# Patient Record
Sex: FEMALE | Race: ASIAN | NOT HISPANIC OR LATINO | URBAN - METROPOLITAN AREA
[De-identification: names, ages, dates, MRNs, and addresses within clinical notes are randomized per-mention and may not be internally consistent; named-entity substitution may affect disease eponyms.]

---

## 2021-06-22 ENCOUNTER — EMERGENCY (EMERGENCY)
Facility: HOSPITAL | Age: 66
LOS: 1 days | Discharge: HOME | End: 2021-06-22
Attending: EMERGENCY MEDICINE | Admitting: EMERGENCY MEDICINE
Payer: MEDICAID

## 2021-06-22 VITALS
DIASTOLIC BLOOD PRESSURE: 95 MMHG | OXYGEN SATURATION: 97 % | HEIGHT: 61 IN | WEIGHT: 113.1 LBS | HEART RATE: 77 BPM | RESPIRATION RATE: 18 BRPM | TEMPERATURE: 98 F | SYSTOLIC BLOOD PRESSURE: 153 MMHG

## 2021-06-22 VITALS
SYSTOLIC BLOOD PRESSURE: 127 MMHG | DIASTOLIC BLOOD PRESSURE: 78 MMHG | TEMPERATURE: 98 F | OXYGEN SATURATION: 96 % | RESPIRATION RATE: 15 BRPM | HEART RATE: 72 BPM

## 2021-06-22 LAB
ANION GAP SERPL CALC-SCNC: 9 MMOL/L — SIGNIFICANT CHANGE UP (ref 5–17)
APPEARANCE UR: CLEAR — SIGNIFICANT CHANGE UP
BASOPHILS # BLD AUTO: 0.04 K/UL — SIGNIFICANT CHANGE UP (ref 0–0.2)
BASOPHILS NFR BLD AUTO: 0.5 % — SIGNIFICANT CHANGE UP (ref 0–2)
BILIRUB UR-MCNC: NEGATIVE — SIGNIFICANT CHANGE UP
BUN SERPL-MCNC: 16 MG/DL — SIGNIFICANT CHANGE UP (ref 7–23)
CALCIUM SERPL-MCNC: 9.3 MG/DL — SIGNIFICANT CHANGE UP (ref 8.4–10.5)
CHLORIDE SERPL-SCNC: 104 MMOL/L — SIGNIFICANT CHANGE UP (ref 96–108)
CO2 SERPL-SCNC: 28 MMOL/L — SIGNIFICANT CHANGE UP (ref 22–31)
COLOR SPEC: YELLOW — SIGNIFICANT CHANGE UP
CREAT SERPL-MCNC: 0.61 MG/DL — SIGNIFICANT CHANGE UP (ref 0.5–1.3)
DIFF PNL FLD: ABNORMAL
EOSINOPHIL # BLD AUTO: 0.11 K/UL — SIGNIFICANT CHANGE UP (ref 0–0.5)
EOSINOPHIL NFR BLD AUTO: 1.3 % — SIGNIFICANT CHANGE UP (ref 0–6)
GLUCOSE SERPL-MCNC: 116 MG/DL — HIGH (ref 70–99)
GLUCOSE UR QL: NEGATIVE MG/DL — SIGNIFICANT CHANGE UP
HCT VFR BLD CALC: 39.6 % — SIGNIFICANT CHANGE UP (ref 34.5–45)
HGB BLD-MCNC: 13.7 G/DL — SIGNIFICANT CHANGE UP (ref 11.5–15.5)
IMM GRANULOCYTES NFR BLD AUTO: 0.2 % — SIGNIFICANT CHANGE UP (ref 0–1.5)
KETONES UR-MCNC: NEGATIVE — SIGNIFICANT CHANGE UP
LEUKOCYTE ESTERASE UR-ACNC: ABNORMAL
LYMPHOCYTES # BLD AUTO: 1.88 K/UL — SIGNIFICANT CHANGE UP (ref 1–3.3)
LYMPHOCYTES # BLD AUTO: 22.2 % — SIGNIFICANT CHANGE UP (ref 13–44)
MAGNESIUM SERPL-MCNC: 2.2 MG/DL — SIGNIFICANT CHANGE UP (ref 1.6–2.6)
MCHC RBC-ENTMCNC: 31.9 PG — SIGNIFICANT CHANGE UP (ref 27–34)
MCHC RBC-ENTMCNC: 34.6 GM/DL — SIGNIFICANT CHANGE UP (ref 32–36)
MCV RBC AUTO: 92.3 FL — SIGNIFICANT CHANGE UP (ref 80–100)
MONOCYTES # BLD AUTO: 0.7 K/UL — SIGNIFICANT CHANGE UP (ref 0–0.9)
MONOCYTES NFR BLD AUTO: 8.3 % — SIGNIFICANT CHANGE UP (ref 2–14)
NEUTROPHILS # BLD AUTO: 5.73 K/UL — SIGNIFICANT CHANGE UP (ref 1.8–7.4)
NEUTROPHILS NFR BLD AUTO: 67.5 % — SIGNIFICANT CHANGE UP (ref 43–77)
NITRITE UR-MCNC: NEGATIVE — SIGNIFICANT CHANGE UP
NRBC # BLD: 0 /100 WBCS — SIGNIFICANT CHANGE UP (ref 0–0)
PH UR: 7 — SIGNIFICANT CHANGE UP (ref 5–8)
PLATELET # BLD AUTO: 222 K/UL — SIGNIFICANT CHANGE UP (ref 150–400)
POTASSIUM SERPL-MCNC: 3.4 MMOL/L — LOW (ref 3.5–5.3)
POTASSIUM SERPL-SCNC: 3.4 MMOL/L — LOW (ref 3.5–5.3)
PROT UR-MCNC: NEGATIVE MG/DL — SIGNIFICANT CHANGE UP
RBC # BLD: 4.29 M/UL — SIGNIFICANT CHANGE UP (ref 3.8–5.2)
RBC # FLD: 12.2 % — SIGNIFICANT CHANGE UP (ref 10.3–14.5)
SODIUM SERPL-SCNC: 141 MMOL/L — SIGNIFICANT CHANGE UP (ref 135–145)
SP GR SPEC: 1 — LOW (ref 1.01–1.02)
TROPONIN I SERPL-MCNC: 0 NG/ML — LOW (ref 0.02–0.06)
UROBILINOGEN FLD QL: NEGATIVE MG/DL — SIGNIFICANT CHANGE UP
WBC # BLD: 8.48 K/UL — SIGNIFICANT CHANGE UP (ref 3.8–10.5)
WBC # FLD AUTO: 8.48 K/UL — SIGNIFICANT CHANGE UP (ref 3.8–10.5)

## 2021-06-22 PROCEDURE — 70450 CT HEAD/BRAIN W/O DYE: CPT

## 2021-06-22 PROCEDURE — 81001 URINALYSIS AUTO W/SCOPE: CPT

## 2021-06-22 PROCEDURE — 93010 ELECTROCARDIOGRAM REPORT: CPT

## 2021-06-22 PROCEDURE — 93005 ELECTROCARDIOGRAM TRACING: CPT

## 2021-06-22 PROCEDURE — 85025 COMPLETE CBC W/AUTO DIFF WBC: CPT

## 2021-06-22 PROCEDURE — 96374 THER/PROPH/DIAG INJ IV PUSH: CPT

## 2021-06-22 PROCEDURE — 83735 ASSAY OF MAGNESIUM: CPT

## 2021-06-22 PROCEDURE — 99284 EMERGENCY DEPT VISIT MOD MDM: CPT | Mod: 25

## 2021-06-22 PROCEDURE — 70450 CT HEAD/BRAIN W/O DYE: CPT | Mod: 26,MA

## 2021-06-22 PROCEDURE — 36415 COLL VENOUS BLD VENIPUNCTURE: CPT

## 2021-06-22 PROCEDURE — 84484 ASSAY OF TROPONIN QUANT: CPT

## 2021-06-22 PROCEDURE — 80048 BASIC METABOLIC PNL TOTAL CA: CPT

## 2021-06-22 PROCEDURE — 99285 EMERGENCY DEPT VISIT HI MDM: CPT

## 2021-06-22 RX ORDER — MECLIZINE HCL 12.5 MG
25 TABLET ORAL ONCE
Refills: 0 | Status: COMPLETED | OUTPATIENT
Start: 2021-06-22 | End: 2021-06-22

## 2021-06-22 RX ORDER — ONDANSETRON 8 MG/1
4 TABLET, FILM COATED ORAL ONCE
Refills: 0 | Status: COMPLETED | OUTPATIENT
Start: 2021-06-22 | End: 2021-06-22

## 2021-06-22 RX ORDER — MECLIZINE HCL 12.5 MG
1 TABLET ORAL
Qty: 15 | Refills: 0
Start: 2021-06-22

## 2021-06-22 RX ORDER — SODIUM CHLORIDE 9 MG/ML
1000 INJECTION INTRAMUSCULAR; INTRAVENOUS; SUBCUTANEOUS ONCE
Refills: 0 | Status: COMPLETED | OUTPATIENT
Start: 2021-06-22 | End: 2021-06-22

## 2021-06-22 RX ADMIN — SODIUM CHLORIDE 1000 MILLILITER(S): 9 INJECTION INTRAMUSCULAR; INTRAVENOUS; SUBCUTANEOUS at 00:45

## 2021-06-22 RX ADMIN — Medication 25 MILLIGRAM(S): at 00:44

## 2021-06-22 RX ADMIN — ONDANSETRON 4 MILLIGRAM(S): 8 TABLET, FILM COATED ORAL at 00:44

## 2021-06-22 NOTE — ED ADULT NURSE REASSESSMENT NOTE - NS ED NURSE REASSESS COMMENT FT1
pt denies dizziness  nausea and vomiting now, pt awaiting CT scan, son at bedside, pt denies dizziness  nausea and vomiting now, c/o headache, pt awaiting CT scan, son at bedside,

## 2021-06-22 NOTE — ED PROVIDER NOTE - CARE PROVIDER_API CALL
Isidoro Milan  NEUROLOGY  [address], [address]  [place] [address]  Phone: [phone]  Fax: [phone]  Follow Up Time: 4-6 Days

## 2021-06-22 NOTE — ED ADULT NURSE NOTE - OBJECTIVE STATEMENT
pt BIBA son c/o HTN, states pt had dizziness, headache and nausea around 11 AM, pt rested and it went away, but the blood pressure remained high, took BP meds half hour PTA to ED, pt felt dizzy and nauseous en route to hospital, pt reports dizziness worse with movement, speech clear , steady gait, CORDOVA, denies visual disturbances,

## 2021-06-22 NOTE — ED PROVIDER NOTE - PROGRESS NOTE DETAILS
continues to be asymptomatic, bp improved since presentation, will send rx for meclizine, will f/u with pcp asymptomatic, waiting for ct head/labs

## 2021-06-22 NOTE — ED PROVIDER NOTE - CPE EDP PSYCH NORM
History of essential hypertension on lisinopril 5 mg daily with blood pressure 128/90 repeated at 128/84 left and right sitting the patient is having a migraine headache at this time I think which speaks for his elevated diastolic.   normal...

## 2021-06-22 NOTE — ED ADULT NURSE REASSESSMENT NOTE - NS ED NURSE REASSESS COMMENT FT1
pt reported feeling better. d/c to son who verbalized understanding of d/c instructions. vs as charted. left unit in NAD. ambulated unassisted

## 2021-06-22 NOTE — ED PROVIDER NOTE - PATIENT PORTAL LINK FT
You can access the FollowMyHealth Patient Portal offered by [facility] by registering at the following website: [email or web address]. By joining [employer]’s FollowMyHealth portal, you will also be able to view your health information using other applications (apps) compatible with our system.

## 2021-06-22 NOTE — ED PROVIDER NOTE - CLINICAL SUMMARY MEDICAL DECISION MAKING FREE TEXT BOX
66 y.o. F BIB family for 1d vertigo with vomiting and elevated BP - has had similar symptoms in the past, never worked up - will check basic labs, ct head, pt took extra dose of BP meds at home, already bp is improved, will give zofran and meclizine for symptoms and reassess

## 2021-06-22 NOTE — ED PROVIDER NOTE - OBJECTIVE STATEMENT
66 y.o. F c/o HTN and dizziness, BIB son, was feeling well this morning, took am meds, had breakfast, around 11 felt tired, laid down, at about 1130 grandson asked her to do something, she got up and felt HA, spinning sensation, nausea and vomited, checked BP and was elevated, tried to rest through the day, the dizziness went away, BP stayed elevated, about 1hr PTA pt took an extra dose of her BP meds, after 30 min BP was not improved, son brought pt here, en route the vertigo came back, feeling slight HA and dizziness/nausea now 66 y.o. F c/o HTN and dizziness, BIB son, was feeling well this morning, took am meds, had breakfast, around 11 felt tired, laid down, at about 1130 grandson asked her to do something, she got up and felt HA, spinning sensation, nausea and vomited, checked BP and was elevated, tried to rest through the day, the dizziness went away, BP stayed elevated, about 1hr PTA pt took an extra dose of her BP meds, after 30 min BP was not improved, son brought pt here, en route the vertigo came back, feeling slight HA and dizziness/nausea now. gets the same feeling about once a year, has been happening for years, never evaluated for it, and only came to ED bc blood pressure was reading high

## 2021-07-28 ENCOUNTER — APPOINTMENT (OUTPATIENT)
Dept: CARDIOLOGY | Facility: CLINIC | Age: 66
End: 2021-07-28
Payer: MEDICAID

## 2021-07-28 VITALS
SYSTOLIC BLOOD PRESSURE: 138 MMHG | DIASTOLIC BLOOD PRESSURE: 84 MMHG | BODY MASS INDEX: 23.03 KG/M2 | RESPIRATION RATE: 18 BRPM | TEMPERATURE: 98.1 F | OXYGEN SATURATION: 97 % | WEIGHT: 114 LBS | HEART RATE: 86 BPM

## 2021-07-28 DIAGNOSIS — R55 SYNCOPE AND COLLAPSE: ICD-10-CM

## 2021-07-28 PROCEDURE — 99214 OFFICE O/P EST MOD 30 MIN: CPT

## 2021-07-28 NOTE — PHYSICAL EXAM
[General Appearance - Well Developed] : well developed [Normal Appearance] : normal appearance [Well Groomed] : well groomed [General Appearance - Well Nourished] : well nourished [No Deformities] : no deformities [General Appearance - In No Acute Distress] : no acute distress [Normal Conjunctiva] : the conjunctiva exhibited no abnormalities [Eyelids - No Xanthelasma] : the eyelids demonstrated no xanthelasmas [Normal Oral Mucosa] : normal oral mucosa [No Oral Pallor] : no oral pallor [No Oral Cyanosis] : no oral cyanosis [Normal Jugular Venous A Waves Present] : normal jugular venous A waves present [Normal Jugular Venous V Waves Present] : normal jugular venous V waves present [No Jugular Venous Rosa A Waves] : no jugular venous rosa A waves [Heart Rate And Rhythm] : heart rate and rhythm were normal [Heart Sounds] : normal S1 and S2 [Murmurs] : no murmurs present [Arterial Pulses Normal] : the arterial pulses were normal [Edema] : no peripheral edema present [Respiration, Rhythm And Depth] : normal respiratory rhythm and effort [Exaggerated Use Of Accessory Muscles For Inspiration] : no accessory muscle use [Auscultation Breath Sounds / Voice Sounds] : lungs were clear to auscultation bilaterally [Abdomen Soft] : soft [Abdomen Tenderness] : non-tender [Abdomen Mass (___ Cm)] : no abdominal mass palpated [Abnormal Walk] : normal gait [Gait - Sufficient For Exercise Testing] : the gait was sufficient for exercise testing [Nail Clubbing] : no clubbing of the fingernails [Cyanosis, Localized] : no localized cyanosis [Petechial Hemorrhages (___cm)] : no petechial hemorrhages [] : no ischemic changes [Oriented To Time, Place, And Person] : oriented to person, place, and time [Affect] : the affect was normal [Mood] : the mood was normal [No Anxiety] : not feeling anxious

## 2021-07-30 ENCOUNTER — APPOINTMENT (OUTPATIENT)
Dept: CARDIOLOGY | Facility: CLINIC | Age: 66
End: 2021-07-30
Payer: MEDICAID

## 2021-07-30 VITALS
RESPIRATION RATE: 18 BRPM | HEART RATE: 73 BPM | SYSTOLIC BLOOD PRESSURE: 130 MMHG | TEMPERATURE: 98.2 F | DIASTOLIC BLOOD PRESSURE: 80 MMHG | OXYGEN SATURATION: 97 %

## 2021-07-30 PROCEDURE — 93306 TTE W/DOPPLER COMPLETE: CPT

## 2021-07-31 PROBLEM — R55 SYNCOPE: Status: ACTIVE | Noted: 2021-07-30

## 2021-08-03 NOTE — DISCUSSION/SUMMARY
[FreeTextEntry1] : 60-year-old female with HTN, hyperlipidemia, h/o tachycardia s/p ablation, hiatal hernia, hyperglycemia, osteoporosis, presents for evaluation of chest discomfort.  Patient reports that for the past week she has been experiencing upper and substernal chest discomfort, described as tightness, not related to exertion or to meals, relieved with hiccups.  She denies SOB with exertion.  She denies palpitations ever since ablation.  She denies h/o syncope.\par \par (1) Chest discomfort, non-exertional -  Patient underwent a stress echo and completed 7.5 minutes of Marcelino protocol.  There was no ECG or echocardiographic evidence of ischemia.  Patient was reassured.  Her symptom may be GI in etiology.  I advised her to continue Omeprazole. I advised her to hold Alendronate until she feels better.\par \par (2) Followup - as needed.

## 2021-08-03 NOTE — HISTORY OF PRESENT ILLNESS
[FreeTextEntry1] : 66-year-old female with HTN, hyperlipidemia, h/o tachycardia s/p ablation, hiatal hernia, hyperglycemia, osteoporosis, presents for followup.  \par \par Patient was last seen on 5/28/15 for evaluation of chest discomfort.  Patient underwent a stress echo and completed 7.5 minutes of Marcelino protocol. There was no ECG or echocardiographic evidence of ischemia. Her symptom was felt to be GI in etiology.  I advised her to continue Omeprazole. I advised her to hold Alendronate until she feels better.\par \par

## 2021-08-03 NOTE — REASON FOR VISIT
[Symptom and Test Evaluation] : symptom and test evaluation [FreeTextEntry1] : 7/28/21 - Patient had recent episode of syncope when she had stomach ache using the bathroom. Patient hit her head and was unconscious with diaphoresis. Patient reports palpitations. Patient denies CP. Patient denies SOB.Patient had an episode of vertigo with vomiting 1 month ago. Her BP was elevated at the time and she went to ER. She had head CT which was negative. She has been having headaches since.  I advised patient to undergo an echocardiogram. I advised patient to wear a Holter monitor. \par \par \par 5/28/15 - Patient reports that for the past week she has been experiencing upper and substernal chest discomfort, described as tightness, not related to exertion or to meals, relieved with hiccups.  She denies SOB with exertion.  She denies palpitations ever since ablation.  She denies h/o syncope.

## 2021-08-04 ENCOUNTER — NON-APPOINTMENT (OUTPATIENT)
Age: 66
End: 2021-08-04

## 2021-08-05 ENCOUNTER — NON-APPOINTMENT (OUTPATIENT)
Age: 66
End: 2021-08-05

## 2021-08-10 ENCOUNTER — APPOINTMENT (OUTPATIENT)
Dept: CARDIOLOGY | Facility: CLINIC | Age: 66
End: 2021-08-10
Payer: MEDICAID

## 2021-08-10 ENCOUNTER — NON-APPOINTMENT (OUTPATIENT)
Age: 66
End: 2021-08-10

## 2021-08-10 VITALS
OXYGEN SATURATION: 98 % | SYSTOLIC BLOOD PRESSURE: 144 MMHG | WEIGHT: 112 LBS | TEMPERATURE: 97.6 F | HEART RATE: 76 BPM | DIASTOLIC BLOOD PRESSURE: 84 MMHG | RESPIRATION RATE: 18 BRPM | BODY MASS INDEX: 22.62 KG/M2

## 2021-08-10 PROCEDURE — 93000 ELECTROCARDIOGRAM COMPLETE: CPT | Mod: 59

## 2021-08-10 PROCEDURE — 93015 CV STRESS TEST SUPVJ I&R: CPT

## 2021-08-10 PROCEDURE — 99214 OFFICE O/P EST MOD 30 MIN: CPT | Mod: 25

## 2021-10-22 ENCOUNTER — EMERGENCY (EMERGENCY)
Facility: HOSPITAL | Age: 66
LOS: 1 days | Discharge: ANOTHER HOSPITAL | End: 2021-10-22
Attending: EMERGENCY MEDICINE | Admitting: EMERGENCY MEDICINE
Payer: MEDICAID

## 2021-10-22 ENCOUNTER — INPATIENT (INPATIENT)
Facility: HOSPITAL | Age: 66
LOS: 1 days | Discharge: HOME | DRG: 442 | End: 2021-10-24
Attending: STUDENT IN AN ORGANIZED HEALTH CARE EDUCATION/TRAINING PROGRAM | Admitting: STUDENT IN AN ORGANIZED HEALTH CARE EDUCATION/TRAINING PROGRAM
Payer: MEDICAID

## 2021-10-22 VITALS
TEMPERATURE: 98 F | RESPIRATION RATE: 18 BRPM | HEART RATE: 67 BPM | OXYGEN SATURATION: 97 % | DIASTOLIC BLOOD PRESSURE: 95 MMHG | SYSTOLIC BLOOD PRESSURE: 171 MMHG

## 2021-10-22 VITALS
OXYGEN SATURATION: 96 % | DIASTOLIC BLOOD PRESSURE: 83 MMHG | SYSTOLIC BLOOD PRESSURE: 128 MMHG | WEIGHT: 113.54 LBS | HEART RATE: 75 BPM | RESPIRATION RATE: 18 BRPM

## 2021-10-22 VITALS
TEMPERATURE: 99 F | OXYGEN SATURATION: 96 % | HEART RATE: 76 BPM | WEIGHT: 113.1 LBS | HEIGHT: 61 IN | SYSTOLIC BLOOD PRESSURE: 128 MMHG | DIASTOLIC BLOOD PRESSURE: 84 MMHG | RESPIRATION RATE: 18 BRPM

## 2021-10-22 DIAGNOSIS — S36.113A LACERATION OF LIVER, UNSPECIFIED DEGREE, INITIAL ENCOUNTER: ICD-10-CM

## 2021-10-22 LAB
ABO RH CONFIRMATION: SIGNIFICANT CHANGE UP
ALBUMIN SERPL ELPH-MCNC: 3.6 G/DL — SIGNIFICANT CHANGE UP (ref 3.3–5)
ALBUMIN SERPL ELPH-MCNC: 4.7 G/DL — SIGNIFICANT CHANGE UP (ref 3.3–5.2)
ALP SERPL-CCNC: 54 U/L — SIGNIFICANT CHANGE UP (ref 30–120)
ALP SERPL-CCNC: 62 U/L — SIGNIFICANT CHANGE UP (ref 40–120)
ALT FLD-CCNC: 27 U/L — SIGNIFICANT CHANGE UP
ALT FLD-CCNC: 35 U/L DA — SIGNIFICANT CHANGE UP (ref 10–60)
ANION GAP SERPL CALC-SCNC: 14 MMOL/L — SIGNIFICANT CHANGE UP (ref 5–17)
ANION GAP SERPL CALC-SCNC: 8 MMOL/L — SIGNIFICANT CHANGE UP (ref 5–17)
APPEARANCE UR: CLEAR — SIGNIFICANT CHANGE UP
APPEARANCE UR: CLEAR — SIGNIFICANT CHANGE UP
APTT BLD: 30.9 SEC — SIGNIFICANT CHANGE UP (ref 27.5–35.5)
AST SERPL-CCNC: 26 U/L — SIGNIFICANT CHANGE UP
AST SERPL-CCNC: 26 U/L — SIGNIFICANT CHANGE UP (ref 10–40)
BACTERIA # UR AUTO: ABNORMAL
BASOPHILS # BLD AUTO: 0.04 K/UL — SIGNIFICANT CHANGE UP (ref 0–0.2)
BASOPHILS # BLD AUTO: 0.05 K/UL — SIGNIFICANT CHANGE UP (ref 0–0.2)
BASOPHILS NFR BLD AUTO: 0.6 % — SIGNIFICANT CHANGE UP (ref 0–2)
BASOPHILS NFR BLD AUTO: 0.7 % — SIGNIFICANT CHANGE UP (ref 0–2)
BILIRUB SERPL-MCNC: 0.3 MG/DL — SIGNIFICANT CHANGE UP (ref 0.2–1.2)
BILIRUB SERPL-MCNC: 0.4 MG/DL — SIGNIFICANT CHANGE UP (ref 0.4–2)
BILIRUB UR-MCNC: NEGATIVE — SIGNIFICANT CHANGE UP
BILIRUB UR-MCNC: NEGATIVE — SIGNIFICANT CHANGE UP
BLD GP AB SCN SERPL QL: SIGNIFICANT CHANGE UP
BUN SERPL-MCNC: 14.2 MG/DL — SIGNIFICANT CHANGE UP (ref 8–20)
BUN SERPL-MCNC: 19 MG/DL — SIGNIFICANT CHANGE UP (ref 7–23)
CALCIUM SERPL-MCNC: 8.4 MG/DL — LOW (ref 8.6–10.2)
CALCIUM SERPL-MCNC: 8.4 MG/DL — SIGNIFICANT CHANGE UP (ref 8.4–10.5)
CHLORIDE SERPL-SCNC: 100 MMOL/L — SIGNIFICANT CHANGE UP (ref 98–107)
CHLORIDE SERPL-SCNC: 105 MMOL/L — SIGNIFICANT CHANGE UP (ref 96–108)
CO2 SERPL-SCNC: 24 MMOL/L — SIGNIFICANT CHANGE UP (ref 22–29)
CO2 SERPL-SCNC: 25 MMOL/L — SIGNIFICANT CHANGE UP (ref 22–31)
COLOR SPEC: YELLOW — SIGNIFICANT CHANGE UP
COLOR SPEC: YELLOW — SIGNIFICANT CHANGE UP
CREAT SERPL-MCNC: 0.54 MG/DL — SIGNIFICANT CHANGE UP (ref 0.5–1.3)
CREAT SERPL-MCNC: 0.68 MG/DL — SIGNIFICANT CHANGE UP (ref 0.5–1.3)
DIFF PNL FLD: ABNORMAL
DIFF PNL FLD: NEGATIVE — SIGNIFICANT CHANGE UP
EOSINOPHIL # BLD AUTO: 0.09 K/UL — SIGNIFICANT CHANGE UP (ref 0–0.5)
EOSINOPHIL # BLD AUTO: 0.1 K/UL — SIGNIFICANT CHANGE UP (ref 0–0.5)
EOSINOPHIL NFR BLD AUTO: 1.3 % — SIGNIFICANT CHANGE UP (ref 0–6)
EOSINOPHIL NFR BLD AUTO: 1.4 % — SIGNIFICANT CHANGE UP (ref 0–6)
EPI CELLS # UR: SIGNIFICANT CHANGE UP
GLUCOSE SERPL-MCNC: 132 MG/DL — HIGH (ref 70–99)
GLUCOSE SERPL-MCNC: 173 MG/DL — HIGH (ref 70–99)
GLUCOSE UR QL: NEGATIVE MG/DL — SIGNIFICANT CHANGE UP
GLUCOSE UR QL: NEGATIVE MG/DL — SIGNIFICANT CHANGE UP
HCT VFR BLD CALC: 38 % — SIGNIFICANT CHANGE UP (ref 34.5–45)
HCT VFR BLD CALC: 41.7 % — SIGNIFICANT CHANGE UP (ref 34.5–45)
HGB BLD-MCNC: 12.7 G/DL — SIGNIFICANT CHANGE UP (ref 11.5–15.5)
HGB BLD-MCNC: 14 G/DL — SIGNIFICANT CHANGE UP (ref 11.5–15.5)
IMM GRANULOCYTES NFR BLD AUTO: 0.3 % — SIGNIFICANT CHANGE UP (ref 0–1.5)
IMM GRANULOCYTES NFR BLD AUTO: 0.3 % — SIGNIFICANT CHANGE UP (ref 0–1.5)
INR BLD: 0.96 RATIO — SIGNIFICANT CHANGE UP (ref 0.88–1.16)
KETONES UR-MCNC: NEGATIVE — SIGNIFICANT CHANGE UP
KETONES UR-MCNC: NEGATIVE — SIGNIFICANT CHANGE UP
LEUKOCYTE ESTERASE UR-ACNC: NEGATIVE — SIGNIFICANT CHANGE UP
LEUKOCYTE ESTERASE UR-ACNC: NEGATIVE — SIGNIFICANT CHANGE UP
LYMPHOCYTES # BLD AUTO: 1.57 K/UL — SIGNIFICANT CHANGE UP (ref 1–3.3)
LYMPHOCYTES # BLD AUTO: 2.18 K/UL — SIGNIFICANT CHANGE UP (ref 1–3.3)
LYMPHOCYTES # BLD AUTO: 22.7 % — SIGNIFICANT CHANGE UP (ref 13–44)
LYMPHOCYTES # BLD AUTO: 30.8 % — SIGNIFICANT CHANGE UP (ref 13–44)
MCHC RBC-ENTMCNC: 31.6 PG — SIGNIFICANT CHANGE UP (ref 27–34)
MCHC RBC-ENTMCNC: 31.8 PG — SIGNIFICANT CHANGE UP (ref 27–34)
MCHC RBC-ENTMCNC: 33.4 GM/DL — SIGNIFICANT CHANGE UP (ref 32–36)
MCHC RBC-ENTMCNC: 33.6 GM/DL — SIGNIFICANT CHANGE UP (ref 32–36)
MCV RBC AUTO: 94.1 FL — SIGNIFICANT CHANGE UP (ref 80–100)
MCV RBC AUTO: 95 FL — SIGNIFICANT CHANGE UP (ref 80–100)
MONOCYTES # BLD AUTO: 0.37 K/UL — SIGNIFICANT CHANGE UP (ref 0–0.9)
MONOCYTES # BLD AUTO: 0.4 K/UL — SIGNIFICANT CHANGE UP (ref 0–0.9)
MONOCYTES NFR BLD AUTO: 5.2 % — SIGNIFICANT CHANGE UP (ref 2–14)
MONOCYTES NFR BLD AUTO: 5.8 % — SIGNIFICANT CHANGE UP (ref 2–14)
NEUTROPHILS # BLD AUTO: 4.36 K/UL — SIGNIFICANT CHANGE UP (ref 1.8–7.4)
NEUTROPHILS # BLD AUTO: 4.79 K/UL — SIGNIFICANT CHANGE UP (ref 1.8–7.4)
NEUTROPHILS NFR BLD AUTO: 61.6 % — SIGNIFICANT CHANGE UP (ref 43–77)
NEUTROPHILS NFR BLD AUTO: 69.3 % — SIGNIFICANT CHANGE UP (ref 43–77)
NITRITE UR-MCNC: NEGATIVE — SIGNIFICANT CHANGE UP
NITRITE UR-MCNC: NEGATIVE — SIGNIFICANT CHANGE UP
NRBC # BLD: 0 /100 WBCS — SIGNIFICANT CHANGE UP (ref 0–0)
PH UR: 7 — SIGNIFICANT CHANGE UP (ref 5–8)
PH UR: 7 — SIGNIFICANT CHANGE UP (ref 5–8)
PLATELET # BLD AUTO: 199 K/UL — SIGNIFICANT CHANGE UP (ref 150–400)
PLATELET # BLD AUTO: 222 K/UL — SIGNIFICANT CHANGE UP (ref 150–400)
POTASSIUM SERPL-MCNC: 3.6 MMOL/L — SIGNIFICANT CHANGE UP (ref 3.5–5.3)
POTASSIUM SERPL-MCNC: 3.7 MMOL/L — SIGNIFICANT CHANGE UP (ref 3.5–5.3)
POTASSIUM SERPL-SCNC: 3.6 MMOL/L — SIGNIFICANT CHANGE UP (ref 3.5–5.3)
POTASSIUM SERPL-SCNC: 3.7 MMOL/L — SIGNIFICANT CHANGE UP (ref 3.5–5.3)
PROT SERPL-MCNC: 7.7 G/DL — SIGNIFICANT CHANGE UP (ref 6–8.3)
PROT SERPL-MCNC: 8.2 G/DL — SIGNIFICANT CHANGE UP (ref 6.6–8.7)
PROT UR-MCNC: NEGATIVE MG/DL — SIGNIFICANT CHANGE UP
PROT UR-MCNC: NEGATIVE MG/DL — SIGNIFICANT CHANGE UP
PROTHROM AB SERPL-ACNC: 11.2 SEC — SIGNIFICANT CHANGE UP (ref 10.6–13.6)
RBC # BLD: 4 M/UL — SIGNIFICANT CHANGE UP (ref 3.8–5.2)
RBC # BLD: 4.43 M/UL — SIGNIFICANT CHANGE UP (ref 3.8–5.2)
RBC # FLD: 12.3 % — SIGNIFICANT CHANGE UP (ref 10.3–14.5)
RBC # FLD: 12.3 % — SIGNIFICANT CHANGE UP (ref 10.3–14.5)
RBC CASTS # UR COMP ASSIST: SIGNIFICANT CHANGE UP /HPF (ref 0–4)
SARS-COV-2 RNA SPEC QL NAA+PROBE: SIGNIFICANT CHANGE UP
SODIUM SERPL-SCNC: 138 MMOL/L — SIGNIFICANT CHANGE UP (ref 135–145)
SODIUM SERPL-SCNC: 138 MMOL/L — SIGNIFICANT CHANGE UP (ref 135–145)
SP GR SPEC: 1 — LOW (ref 1.01–1.02)
SP GR SPEC: 1.01 — SIGNIFICANT CHANGE UP (ref 1.01–1.02)
UROBILINOGEN FLD QL: NEGATIVE MG/DL — SIGNIFICANT CHANGE UP
UROBILINOGEN FLD QL: NEGATIVE MG/DL — SIGNIFICANT CHANGE UP
WBC # BLD: 6.91 K/UL — SIGNIFICANT CHANGE UP (ref 3.8–10.5)
WBC # BLD: 7.08 K/UL — SIGNIFICANT CHANGE UP (ref 3.8–10.5)
WBC # FLD AUTO: 6.91 K/UL — SIGNIFICANT CHANGE UP (ref 3.8–10.5)
WBC # FLD AUTO: 7.08 K/UL — SIGNIFICANT CHANGE UP (ref 3.8–10.5)
WBC UR QL: SIGNIFICANT CHANGE UP

## 2021-10-22 PROCEDURE — 87635 SARS-COV-2 COVID-19 AMP PRB: CPT

## 2021-10-22 PROCEDURE — 74177 CT ABD & PELVIS W/CONTRAST: CPT | Mod: 26,MA

## 2021-10-22 PROCEDURE — 85025 COMPLETE CBC W/AUTO DIFF WBC: CPT

## 2021-10-22 PROCEDURE — 99285 EMERGENCY DEPT VISIT HI MDM: CPT

## 2021-10-22 PROCEDURE — 99291 CRITICAL CARE FIRST HOUR: CPT

## 2021-10-22 PROCEDURE — 99285 EMERGENCY DEPT VISIT HI MDM: CPT | Mod: 25

## 2021-10-22 PROCEDURE — 81003 URINALYSIS AUTO W/O SCOPE: CPT

## 2021-10-22 PROCEDURE — 96360 HYDRATION IV INFUSION INIT: CPT

## 2021-10-22 PROCEDURE — 80053 COMPREHEN METABOLIC PANEL: CPT

## 2021-10-22 PROCEDURE — 71260 CT THORAX DX C+: CPT | Mod: 26,MA

## 2021-10-22 PROCEDURE — 74177 CT ABD & PELVIS W/CONTRAST: CPT | Mod: MA

## 2021-10-22 PROCEDURE — 36415 COLL VENOUS BLD VENIPUNCTURE: CPT

## 2021-10-22 PROCEDURE — 93010 ELECTROCARDIOGRAM REPORT: CPT

## 2021-10-22 PROCEDURE — 71260 CT THORAX DX C+: CPT | Mod: MA

## 2021-10-22 RX ORDER — SODIUM CHLORIDE 9 MG/ML
1000 INJECTION, SOLUTION INTRAVENOUS
Refills: 0 | Status: DISCONTINUED | OUTPATIENT
Start: 2021-10-22 | End: 2021-10-24

## 2021-10-22 RX ORDER — LIDOCAINE 4 G/100G
1 CREAM TOPICAL ONCE
Refills: 0 | Status: COMPLETED | OUTPATIENT
Start: 2021-10-22 | End: 2021-10-22

## 2021-10-22 RX ORDER — OXYCODONE AND ACETAMINOPHEN 5; 325 MG/1; MG/1
1 TABLET ORAL ONCE
Refills: 0 | Status: DISCONTINUED | OUTPATIENT
Start: 2021-10-22 | End: 2021-10-22

## 2021-10-22 RX ORDER — ACETAMINOPHEN 500 MG
650 TABLET ORAL EVERY 6 HOURS
Refills: 0 | Status: DISCONTINUED | OUTPATIENT
Start: 2021-10-22 | End: 2021-10-24

## 2021-10-22 RX ORDER — SODIUM CHLORIDE 9 MG/ML
250 INJECTION INTRAMUSCULAR; INTRAVENOUS; SUBCUTANEOUS ONCE
Refills: 0 | Status: COMPLETED | OUTPATIENT
Start: 2021-10-22 | End: 2021-10-22

## 2021-10-22 RX ORDER — OXYCODONE HYDROCHLORIDE 5 MG/1
5 TABLET ORAL EVERY 4 HOURS
Refills: 0 | Status: DISCONTINUED | OUTPATIENT
Start: 2021-10-22 | End: 2021-10-24

## 2021-10-22 RX ORDER — SODIUM CHLORIDE 9 MG/ML
1000 INJECTION INTRAMUSCULAR; INTRAVENOUS; SUBCUTANEOUS ONCE
Refills: 0 | Status: COMPLETED | OUTPATIENT
Start: 2021-10-22 | End: 2021-10-22

## 2021-10-22 RX ORDER — LIDOCAINE 4 G/100G
1 CREAM TOPICAL EVERY 24 HOURS
Refills: 0 | Status: DISCONTINUED | OUTPATIENT
Start: 2021-10-22 | End: 2021-10-24

## 2021-10-22 RX ORDER — FAMOTIDINE 10 MG/ML
20 INJECTION INTRAVENOUS EVERY 12 HOURS
Refills: 0 | Status: DISCONTINUED | OUTPATIENT
Start: 2021-10-22 | End: 2021-10-24

## 2021-10-22 RX ADMIN — OXYCODONE AND ACETAMINOPHEN 1 TABLET(S): 5; 325 TABLET ORAL at 16:31

## 2021-10-22 RX ADMIN — OXYCODONE AND ACETAMINOPHEN 1 TABLET(S): 5; 325 TABLET ORAL at 15:28

## 2021-10-22 RX ADMIN — LIDOCAINE 1 PATCH: 4 CREAM TOPICAL at 15:28

## 2021-10-22 RX ADMIN — SODIUM CHLORIDE 42 MILLILITER(S): 9 INJECTION, SOLUTION INTRAVENOUS at 21:43

## 2021-10-22 RX ADMIN — SODIUM CHLORIDE 1000 MILLILITER(S): 9 INJECTION INTRAMUSCULAR; INTRAVENOUS; SUBCUTANEOUS at 15:29

## 2021-10-22 RX ADMIN — SODIUM CHLORIDE 250 MILLILITER(S): 9 INJECTION INTRAMUSCULAR; INTRAVENOUS; SUBCUTANEOUS at 20:25

## 2021-10-22 RX ADMIN — SODIUM CHLORIDE 1000 MILLILITER(S): 9 INJECTION INTRAMUSCULAR; INTRAVENOUS; SUBCUTANEOUS at 16:31

## 2021-10-22 RX ADMIN — LIDOCAINE 1 PATCH: 4 CREAM TOPICAL at 18:35

## 2021-10-22 NOTE — ED PROVIDER NOTE - MUSCULOSKELETAL, MLM
Spine appears normal, range of motion is not limited, no muscle or joint tenderness, FROM X 4, +abrasion with mild erythema noted to dorsal aspect of R proximal forearm, no bony tenderness or deformities noted, R shoulder/elbow/wrist/hand NT with FROM, fingers warm & mobile, NVI, pelvis stable and NT, no foot drop B, pulses and sensation intact BLE, NVI

## 2021-10-22 NOTE — ED ADULT TRIAGE NOTE - CHIEF COMPLAINT QUOTE
Patient BIBA from [facility]. Trauma transfer accepted by Dr. Cox. Pt s/p mechanical fall. Right 10th rib fracture and Grade 1 liver lac. Code trauma B called.

## 2021-10-22 NOTE — ED PROVIDER NOTE - NS_BEDUNITTYPES_ED_ALL_ED
Patient called to report CD1 yesterday. RN called patient back and scheduled labs and ultrasound for today, tomorrow or Wednesday.   
TELEMETRY

## 2021-10-22 NOTE — ED PROVIDER NOTE - CLINICAL SUMMARY MEDICAL DECISION MAKING FREE TEXT BOX
The patient presents with a fall with right rib pain and abd pain and will be admitted to trauma service

## 2021-10-22 NOTE — ED PROVIDER NOTE - OBJECTIVE STATEMENT
Mandarin intrepretation by daughter, Meli as per pt request. Offered pacific  for translation by pt declined. 65 y/o F with hx of HTN, HLD presents with c/o R flank and rib pain s/p fall today. States that she was vacuuming the wooden stairs and fell down 2 steps hitting her R flank on the corner of the door down the stairs and has pain since. Pt also c/o R lower rib pain, worse with deep inspiration. Denies LOC, head trauma, use of blood thinners, N/V, abdominal pain, numbness, tingling to extremities, open wounds, neck/hip pain or other symptoms/injuries.

## 2021-10-22 NOTE — ED ADULT NURSE NOTE - OBJECTIVE STATEMENT
66 YOF A&OX3 mandarin speaking only (daughter at bedside for translation) presents to ED s/p fall. as per pt's daughter pt was cleaning down the stairs with a portable vaccuum and fell down last 2 steps hitting right side of body against wall. pt did not hit head or lose consciousness. pt complains of right lower back pain and right upper chest wall pain under right breast tender to palpation rated 9/10. upon assessment right arm abrasion with a hematoma noted to posterior upper area of arm. pt denies sob, chest pain, n/v/d, headaches, dizziness, blurry vision. safety maintained.

## 2021-10-22 NOTE — ED PROVIDER NOTE - ATTENDING CONTRIBUTION TO CARE
The patient seen and examined due to critical conditions and required multiple attempts to stabilize the patient

## 2021-10-22 NOTE — ED ADULT NURSE NOTE - NSIMPLEMENTINTERV_GEN_ALL_ED
Implemented All Fall Risk Interventions:  [place] to call system. Call bell, personal items and telephone within reach. Instruct patient to call for assistance. Room bathroom lighting operational. Non-slip footwear when patient is off stretcher. Physically safe environment: no spills, clutter or unnecessary equipment. Stretcher in lowest position, wheels locked, appropriate side rails in place. Provide visual cue, wrist band, yellow gown, etc. Monitor gait and stability. Monitor for mental status changes and reorient to person, place, and time. Review medications for side effects contributing to fall risk. Reinforce activity limits and safety measures with patient and family.

## 2021-10-22 NOTE — ED PROVIDER NOTE - PROGRESS NOTE DETAILS
Remy YANG for ED attending, Dr. Davis: Spoke Dr. Grove, trauma surgeon at [facility] who accepted transfer. Remy YANG for ED attending, Dr. Davis: #: [phone], Spoke pt and mother and informed them of liver laceration and rib fracture and regarding transfer to [facility]. All results were explained to patient and/or family and a copy of all available results given.

## 2021-10-22 NOTE — ED PROVIDER NOTE - NS_ ATTENDINGSCRIBEDETAILS _ED_A_ED_FT
Moose Davis MD - The scribe's documentation has been prepared under my direction and personally reviewed by me in its entirety. I confirm that the note above accurately reflects all work, treatment, procedures, and medical decision making performed by me.

## 2021-10-22 NOTE — ED PROVIDER NOTE - PROGRESS NOTE DETAILS
Resident Sridevi Dickinson: Assessed the pt with Dr. Jackson, transfer from [place], presented for fall down 2 stairs, c/o R abdominal/flank pain, w/u showed grade 1-2 liver laceration and 10th rib fx. Mandarin-speaking.

## 2021-10-22 NOTE — ED ADULT NURSE REASSESSMENT NOTE - NS ED NURSE REASSESS COMMENT FT1
pt informed via  Omayra ([id number]), pt and daughter notified pt to be transferred to [facility], pt gives consent. safety maintained.

## 2021-10-22 NOTE — ED PROVIDER NOTE - RESPIRATORY, MLM
Breath sounds clear and equal bilaterally. +TTP R lower anterior/lateral ribs, no ecchymosis or obvious stepoffs noted

## 2021-10-22 NOTE — ED PROVIDER NOTE - OBJECTIVE STATEMENT
The patient is a 66 year old female presents with fall in the stairs transferred from [facility] for right sided rib fracture and grade 1 liver laceration.  No HA, No neck pain, No CP, No SOB  Stable VS and normal Hb and trauma B activation

## 2021-10-22 NOTE — ED PROVIDER NOTE - CLINICAL SUMMARY MEDICAL DECISION MAKING FREE TEXT BOX
65 y/o F with hx of HTN, HLD presents with c/o R flank and rib pain s/p fall today. States that she was vacuuming the wooden stairs and fell down 2 steps hitting her R flank on the corner of the door down the stairs and has pain since. Pt also c/o R lower rib pain, worse with deep inspiration. Denies LOC, head trauma, use of blood thinners, N/V, abdominal pain, numbness, tingling to extremities, open wounds, neck/hip pain or other symptoms/injuries. PE: as above A/P: will get labs, UA, ct chest/abdomen/pelvis r/o rib/ptx, r/o kidney injury, pain meds, reassess

## 2021-10-22 NOTE — ED ADULT NURSE NOTE - CHIEF COMPLAINT QUOTE
Patient BIBA from [facility]. Trauma transfer accepted by Dr. Cox. Pt s/p mechanical fall. Right 10th rib fracture and Grade 1 liver lac. Code trauma B called.

## 2021-10-23 LAB
ALBUMIN SERPL ELPH-MCNC: 4.5 G/DL — SIGNIFICANT CHANGE UP (ref 3.3–5.2)
ALBUMIN SERPL ELPH-MCNC: 4.6 G/DL — SIGNIFICANT CHANGE UP (ref 3.3–5.2)
ALP SERPL-CCNC: 57 U/L — SIGNIFICANT CHANGE UP (ref 40–120)
ALP SERPL-CCNC: 58 U/L — SIGNIFICANT CHANGE UP (ref 40–120)
ALT FLD-CCNC: 24 U/L — SIGNIFICANT CHANGE UP
ALT FLD-CCNC: 24 U/L — SIGNIFICANT CHANGE UP
ANION GAP SERPL CALC-SCNC: 14 MMOL/L — SIGNIFICANT CHANGE UP (ref 5–17)
ANION GAP SERPL CALC-SCNC: 15 MMOL/L — SIGNIFICANT CHANGE UP (ref 5–17)
APTT BLD: 29 SEC — SIGNIFICANT CHANGE UP (ref 27.5–35.5)
AST SERPL-CCNC: 23 U/L — SIGNIFICANT CHANGE UP
AST SERPL-CCNC: 23 U/L — SIGNIFICANT CHANGE UP
BASOPHILS # BLD AUTO: 0.04 K/UL — SIGNIFICANT CHANGE UP (ref 0–0.2)
BASOPHILS # BLD AUTO: 0.05 K/UL — SIGNIFICANT CHANGE UP (ref 0–0.2)
BASOPHILS NFR BLD AUTO: 0.6 % — SIGNIFICANT CHANGE UP (ref 0–2)
BASOPHILS NFR BLD AUTO: 0.7 % — SIGNIFICANT CHANGE UP (ref 0–2)
BILIRUB SERPL-MCNC: 0.3 MG/DL — LOW (ref 0.4–2)
BILIRUB SERPL-MCNC: 0.4 MG/DL — SIGNIFICANT CHANGE UP (ref 0.4–2)
BUN SERPL-MCNC: 13.8 MG/DL — SIGNIFICANT CHANGE UP (ref 8–20)
BUN SERPL-MCNC: 14 MG/DL — SIGNIFICANT CHANGE UP (ref 8–20)
CALCIUM SERPL-MCNC: 8.3 MG/DL — LOW (ref 8.6–10.2)
CALCIUM SERPL-MCNC: 8.3 MG/DL — LOW (ref 8.6–10.2)
CHLORIDE SERPL-SCNC: 103 MMOL/L — SIGNIFICANT CHANGE UP (ref 98–107)
CHLORIDE SERPL-SCNC: 103 MMOL/L — SIGNIFICANT CHANGE UP (ref 98–107)
CO2 SERPL-SCNC: 23 MMOL/L — SIGNIFICANT CHANGE UP (ref 22–29)
CO2 SERPL-SCNC: 24 MMOL/L — SIGNIFICANT CHANGE UP (ref 22–29)
COVID-19 SPIKE DOMAIN AB INTERP: POSITIVE
COVID-19 SPIKE DOMAIN ANTIBODY RESULT: >250 U/ML — HIGH
CREAT SERPL-MCNC: 0.42 MG/DL — LOW (ref 0.5–1.3)
CREAT SERPL-MCNC: 0.51 MG/DL — SIGNIFICANT CHANGE UP (ref 0.5–1.3)
EOSINOPHIL # BLD AUTO: 0.13 K/UL — SIGNIFICANT CHANGE UP (ref 0–0.5)
EOSINOPHIL # BLD AUTO: 0.14 K/UL — SIGNIFICANT CHANGE UP (ref 0–0.5)
EOSINOPHIL NFR BLD AUTO: 1.8 % — SIGNIFICANT CHANGE UP (ref 0–6)
EOSINOPHIL NFR BLD AUTO: 1.9 % — SIGNIFICANT CHANGE UP (ref 0–6)
GLUCOSE SERPL-MCNC: 103 MG/DL — HIGH (ref 70–99)
GLUCOSE SERPL-MCNC: 97 MG/DL — SIGNIFICANT CHANGE UP (ref 70–99)
HCT VFR BLD CALC: 38.4 % — SIGNIFICANT CHANGE UP (ref 34.5–45)
HCT VFR BLD CALC: 38.7 % — SIGNIFICANT CHANGE UP (ref 34.5–45)
HCT VFR BLD CALC: 39.5 % — SIGNIFICANT CHANGE UP (ref 34.5–45)
HCT VFR BLD CALC: 39.5 % — SIGNIFICANT CHANGE UP (ref 34.5–45)
HCT VFR BLD CALC: 39.6 % — SIGNIFICANT CHANGE UP (ref 34.5–45)
HGB BLD-MCNC: 12.9 G/DL — SIGNIFICANT CHANGE UP (ref 11.5–15.5)
HGB BLD-MCNC: 13 G/DL — SIGNIFICANT CHANGE UP (ref 11.5–15.5)
HGB BLD-MCNC: 13.3 G/DL — SIGNIFICANT CHANGE UP (ref 11.5–15.5)
HGB BLD-MCNC: 13.3 G/DL — SIGNIFICANT CHANGE UP (ref 11.5–15.5)
HGB BLD-MCNC: 13.4 G/DL — SIGNIFICANT CHANGE UP (ref 11.5–15.5)
IMM GRANULOCYTES NFR BLD AUTO: 0.1 % — SIGNIFICANT CHANGE UP (ref 0–1.5)
IMM GRANULOCYTES NFR BLD AUTO: 0.4 % — SIGNIFICANT CHANGE UP (ref 0–1.5)
INR BLD: 1.01 RATIO — SIGNIFICANT CHANGE UP (ref 0.88–1.16)
LYMPHOCYTES # BLD AUTO: 2.16 K/UL — SIGNIFICANT CHANGE UP (ref 1–3.3)
LYMPHOCYTES # BLD AUTO: 2.27 K/UL — SIGNIFICANT CHANGE UP (ref 1–3.3)
LYMPHOCYTES # BLD AUTO: 29.8 % — SIGNIFICANT CHANGE UP (ref 13–44)
LYMPHOCYTES # BLD AUTO: 30.4 % — SIGNIFICANT CHANGE UP (ref 13–44)
MAGNESIUM SERPL-MCNC: 2.2 MG/DL — SIGNIFICANT CHANGE UP (ref 1.6–2.6)
MCHC RBC-ENTMCNC: 31.5 PG — SIGNIFICANT CHANGE UP (ref 27–34)
MCHC RBC-ENTMCNC: 31.5 PG — SIGNIFICANT CHANGE UP (ref 27–34)
MCHC RBC-ENTMCNC: 32 PG — SIGNIFICANT CHANGE UP (ref 27–34)
MCHC RBC-ENTMCNC: 32.2 PG — SIGNIFICANT CHANGE UP (ref 27–34)
MCHC RBC-ENTMCNC: 33.3 GM/DL — SIGNIFICANT CHANGE UP (ref 32–36)
MCHC RBC-ENTMCNC: 33.7 GM/DL — SIGNIFICANT CHANGE UP (ref 32–36)
MCHC RBC-ENTMCNC: 33.8 GM/DL — SIGNIFICANT CHANGE UP (ref 32–36)
MCHC RBC-ENTMCNC: 33.9 GM/DL — SIGNIFICANT CHANGE UP (ref 32–36)
MCV RBC AUTO: 93 FL — SIGNIFICANT CHANGE UP (ref 80–100)
MCV RBC AUTO: 94.4 FL — SIGNIFICANT CHANGE UP (ref 80–100)
MCV RBC AUTO: 94.5 FL — SIGNIFICANT CHANGE UP (ref 80–100)
MCV RBC AUTO: 95.6 FL — SIGNIFICANT CHANGE UP (ref 80–100)
MONOCYTES # BLD AUTO: 0.53 K/UL — SIGNIFICANT CHANGE UP (ref 0–0.9)
MONOCYTES # BLD AUTO: 0.54 K/UL — SIGNIFICANT CHANGE UP (ref 0–0.9)
MONOCYTES NFR BLD AUTO: 7.1 % — SIGNIFICANT CHANGE UP (ref 2–14)
MONOCYTES NFR BLD AUTO: 7.4 % — SIGNIFICANT CHANGE UP (ref 2–14)
NEUTROPHILS # BLD AUTO: 4.35 K/UL — SIGNIFICANT CHANGE UP (ref 1.8–7.4)
NEUTROPHILS # BLD AUTO: 4.46 K/UL — SIGNIFICANT CHANGE UP (ref 1.8–7.4)
NEUTROPHILS NFR BLD AUTO: 59.8 % — SIGNIFICANT CHANGE UP (ref 43–77)
NEUTROPHILS NFR BLD AUTO: 60 % — SIGNIFICANT CHANGE UP (ref 43–77)
PHOSPHATE SERPL-MCNC: 3.6 MG/DL — SIGNIFICANT CHANGE UP (ref 2.4–4.7)
PLATELET # BLD AUTO: 208 K/UL — SIGNIFICANT CHANGE UP (ref 150–400)
PLATELET # BLD AUTO: 209 K/UL — SIGNIFICANT CHANGE UP (ref 150–400)
PLATELET # BLD AUTO: 216 K/UL — SIGNIFICANT CHANGE UP (ref 150–400)
PLATELET # BLD AUTO: 219 K/UL — SIGNIFICANT CHANGE UP (ref 150–400)
POTASSIUM SERPL-MCNC: 3.5 MMOL/L — SIGNIFICANT CHANGE UP (ref 3.5–5.3)
POTASSIUM SERPL-MCNC: 3.5 MMOL/L — SIGNIFICANT CHANGE UP (ref 3.5–5.3)
POTASSIUM SERPL-SCNC: 3.5 MMOL/L — SIGNIFICANT CHANGE UP (ref 3.5–5.3)
POTASSIUM SERPL-SCNC: 3.5 MMOL/L — SIGNIFICANT CHANGE UP (ref 3.5–5.3)
PROT SERPL-MCNC: 7.3 G/DL — SIGNIFICANT CHANGE UP (ref 6.6–8.7)
PROT SERPL-MCNC: 7.5 G/DL — SIGNIFICANT CHANGE UP (ref 6.6–8.7)
PROTHROM AB SERPL-ACNC: 11.7 SEC — SIGNIFICANT CHANGE UP (ref 10.6–13.6)
RBC # BLD: 4.1 M/UL — SIGNIFICANT CHANGE UP (ref 3.8–5.2)
RBC # BLD: 4.13 M/UL — SIGNIFICANT CHANGE UP (ref 3.8–5.2)
RBC # BLD: 4.13 M/UL — SIGNIFICANT CHANGE UP (ref 3.8–5.2)
RBC # BLD: 4.19 M/UL — SIGNIFICANT CHANGE UP (ref 3.8–5.2)
RBC # FLD: 12.4 % — SIGNIFICANT CHANGE UP (ref 10.3–14.5)
RBC # FLD: 12.5 % — SIGNIFICANT CHANGE UP (ref 10.3–14.5)
SARS-COV-2 IGG+IGM SERPL QL IA: >250 U/ML — HIGH
SARS-COV-2 IGG+IGM SERPL QL IA: POSITIVE
SARS-COV-2 RNA SPEC QL NAA+PROBE: SIGNIFICANT CHANGE UP
SODIUM SERPL-SCNC: 141 MMOL/L — SIGNIFICANT CHANGE UP (ref 135–145)
SODIUM SERPL-SCNC: 141 MMOL/L — SIGNIFICANT CHANGE UP (ref 135–145)
TROPONIN T SERPL-MCNC: <0.01 NG/ML — SIGNIFICANT CHANGE UP (ref 0–0.06)
WBC # BLD: 6.77 K/UL — SIGNIFICANT CHANGE UP (ref 3.8–10.5)
WBC # BLD: 6.97 K/UL — SIGNIFICANT CHANGE UP (ref 3.8–10.5)
WBC # BLD: 7.25 K/UL — SIGNIFICANT CHANGE UP (ref 3.8–10.5)
WBC # BLD: 7.46 K/UL — SIGNIFICANT CHANGE UP (ref 3.8–10.5)
WBC # FLD AUTO: 6.77 K/UL — SIGNIFICANT CHANGE UP (ref 3.8–10.5)
WBC # FLD AUTO: 6.97 K/UL — SIGNIFICANT CHANGE UP (ref 3.8–10.5)
WBC # FLD AUTO: 7.25 K/UL — SIGNIFICANT CHANGE UP (ref 3.8–10.5)
WBC # FLD AUTO: 7.46 K/UL — SIGNIFICANT CHANGE UP (ref 3.8–10.5)

## 2021-10-23 PROCEDURE — 99231 SBSQ HOSP IP/OBS SF/LOW 25: CPT | Mod: GC

## 2021-10-23 PROCEDURE — 93010 ELECTROCARDIOGRAM REPORT: CPT

## 2021-10-23 RX ORDER — POTASSIUM CHLORIDE 20 MEQ
40 PACKET (EA) ORAL EVERY 4 HOURS
Refills: 0 | Status: COMPLETED | OUTPATIENT
Start: 2021-10-23 | End: 2021-10-23

## 2021-10-23 RX ADMIN — FAMOTIDINE 20 MILLIGRAM(S): 10 INJECTION INTRAVENOUS at 05:10

## 2021-10-23 RX ADMIN — FAMOTIDINE 20 MILLIGRAM(S): 10 INJECTION INTRAVENOUS at 18:18

## 2021-10-23 RX ADMIN — SODIUM CHLORIDE 42 MILLILITER(S): 9 INJECTION, SOLUTION INTRAVENOUS at 22:00

## 2021-10-23 RX ADMIN — Medication 40 MILLIEQUIVALENT(S): at 17:57

## 2021-10-23 RX ADMIN — LIDOCAINE 1 PATCH: 4 CREAM TOPICAL at 21:58

## 2021-10-23 RX ADMIN — Medication 40 MILLIEQUIVALENT(S): at 11:59

## 2021-10-23 RX ADMIN — SODIUM CHLORIDE 42 MILLILITER(S): 9 INJECTION, SOLUTION INTRAVENOUS at 05:49

## 2021-10-23 NOTE — H&P ADULT - ATTENDING COMMENTS
Agree with exam findings.   Will observe with serial abdominal exams and H/H trend; will likely be able to transition towards discharge within 24-48hrs if remains stable.

## 2021-10-23 NOTE — H&P ADULT - NSHPPHYSICALEXAM_GEN_ALL_CORE
Primary   Airway intact  Breathing equal bilaterally  Circulation peripherally and centrally intact   GCS 15  No external signs of trauma    Secondary  Neurologically intact  CVS: Heart sounds heard  RS: EWOB  Abd: Soft, tender in RUQ and R flank

## 2021-10-23 NOTE — H&P ADULT - NSHPLABSRESULTS_GEN_ALL_CORE
.  LABS:                         13.3   x     )-----------( x        ( 23 Oct 2021 00:50 )             39.5     10-    138  |  100  |  14.2  ----------------------------<  173<H>  3.7   |  24.0  |  0.54    Ca    8.4<L>      22 Oct 2021 19:33    TPro  8.2  /  Alb  4.7  /  TBili  0.4  /  DBili  x   /  AST  26  /  ALT  27  /  AlkPhos  62  10-    PT/INR - ( 22 Oct 2021 19:33 )   PT: 11.2 sec;   INR: 0.96 ratio         PTT - ( 22 Oct 2021 19:33 )  PTT:30.9 sec  Urinalysis Basic - ( 22 Oct 2021 23:45 )    Color: Yellow / Appearance: Clear / S.005 / pH: x  Gluc: x / Ketone: Negative  / Bili: Negative / Urobili: Negative mg/dL   Blood: x / Protein: Negative mg/dL / Nitrite: Negative   Leuk Esterase: Negative / RBC: 0-2 /HPF / WBC 0-2   Sq Epi: x / Non Sq Epi: Occasional / Bacteria: Occasional            RADIOLOGY, EKG & ADDITIONAL TESTS: Reviewed.     R 10th Rib Fracture  Grade 1-2 liver lac   Side branch IPMN  Calcified nodules in TEA 0.7cm

## 2021-10-23 NOTE — CHART NOTE - NSCHARTNOTEFT_GEN_A_CORE
Tertiary Trauma Survey (TTS)    Date of TTS:                              Time:   Admit Date:                              Trauma Activation:  Admit GCS: E-     V-     M-     HPI:  Transferred from [facility] after a fall down stairs, was assessed at [place] and notably had a R 10th rib fracture, grade 1-2 liver laceration and transferred to [facility] for higher level care.  Patient had a stable hemoglobin on admission at 14, which was repeated 6 hours after and was 13.3, asymptomatic from any acute bleeding.  Has RUQ and R flank tenderness, not peritonitic on exam.  Denies nausea or vomiting.   (23 Oct 2021 02:06)      PAST MEDICAL & SURGICAL HISTORY:    [  ] No significant past history as reviewed with the patient and family    FAMILY HISTORY:    [  ] Family history not pertinent as reviewed with the patient and family    SOCIAL HISTORY:    Medications (inpatient): famotidine    Tablet 20 milliGRAM(s) Oral every 12 hours  lactated ringers. 1000 milliLiter(s) IV Continuous <Continuous>  lidocaine   4% Patch 1 Patch Transdermal every 24 hours  potassium chloride    Tablet ER 40 milliEquivalent(s) Oral every 4 hours    Medications (PRN):acetaminophen     Tablet .. 650 milliGRAM(s) Oral every 6 hours PRN  oxyCODONE    IR 5 milliGRAM(s) Oral every 4 hours PRN    Allergies: No Known Allergies  (Intolerances: )    Vital Signs Last 24 Hrs  T(C): 36.7 (23 Oct 2021 11:25), Max: 37.1 (22 Oct 2021 23:24)  T(F): 98 (23 Oct 2021 11:25), Max: 98.7 (22 Oct 2021 23:24)  HR: 68 (23 Oct 2021 11:25) (62 - 75)  BP: 119/74 (23 Oct 2021 11:25) (105/65 - 146/72)  BP(mean): 78 (23 Oct 2021 05:03) (78 - 96)  RR: 18 (23 Oct 2021 11:25) (16 - 18)  SpO2: 95% (23 Oct 2021 11:25) (95% - 98%)  Drug Dosing Weight    Weight (kg): 51.5 (22 Oct 2021 19:14)    PHYSICAL EXAM  GEN: NAD, resting quietly  HEENT: NCAT  NEURO: awake, alert  PULM: symmetric chest rise bilaterally, no chest wall tenderness, no crepitus  CV: regular rate, peripheral pulses intact  GI: soft, NTND  EXTR: no cyanosis or edema, moving all extremities, no deformity, no tenderness                          13.3   6.77  )-----------( 216      ( 23 Oct 2021 13:42 )             39.5     10-    141  |  103  |  13.8  ----------------------------<  97  3.5   |  24.0  |  0.51    Ca    8.3<L>      23 Oct 2021 04:03  Phos  3.6     10  Mg     2.2     10-23    TPro  7.3  /  Alb  4.5  /  TBili  0.4  /  DBili  x   /  AST  23  /  ALT  24  /  AlkPhos  57  10    PT/INR - ( 23 Oct 2021 04:01 )   PT: 11.7 sec;   INR: 1.01 ratio         PTT - ( 23 Oct 2021 04:01 )  PTT:29.0 sec  Urinalysis Basic - ( 22 Oct 2021 23:45 )    Color: Yellow / Appearance: Clear / S.005 / pH: x  Gluc: x / Ketone: Negative  / Bili: Negative / Urobili: Negative mg/dL   Blood: x / Protein: Negative mg/dL / Nitrite: Negative   Leuk Esterase: Negative / RBC: 0-2 /HPF / WBC 0-2   Sq Epi: x / Non Sq Epi: Occasional / Bacteria: Occasional        List Injuries Identified to Date:    List Operative and Interventional Radiological Procedures:     Consults (Date):  [  ] Neurosurgery   [  ] Orthopedics  [  ] Plastics  [  ] Urology  [  ] PM&R  [  ] Social Work    RADIOLOGICAL FINDINGS REVIEW:  CXR:    Pelvis Films:     C-Spine Films:    T/L/S Spine Films:    Extremity Films:    Head CT:    C-Spine CT:    Neck CT:    Chest CT:    ABD/Pelvis CT:    Other:    Interpretation of Findings:    HPI:  Transferred from [facility] after a fall down stairs, was assessed at [place] and notably had a R 10th rib fracture, grade 1-2 liver laceration and transferred to [facility] for higher level care.  Patient had a stable hemoglobin on admission at 14, which was repeated 6 hours after and was 13.3, asymptomatic from any acute bleeding.  Has RUQ and R flank tenderness, not peritonitic on exam.  Denies nausea or vomiting.   (23 Oct 2021 02:06)  .  Tertiary exam performed []. Findings as above. No additional injuries identified.    PLAN  - Pain control  - Diet  - Abx  - Consult recs Tertiary Trauma Survey (TTS)    Date of TTS: 10/23/21                             Time: 1630  Admit Date: 10/22/21                             Trauma Activation: 0200  Admit GCS: E-4     V-5     M-6     HPI:  Transferred from [facility] after a fall down stairs, was assessed at [place] and notably had a R 10th rib fracture, grade 1-2 liver laceration and transferred to [facility] for higher level care.  Patient had a stable hemoglobin on admission at 14, which was repeated 6 hours after and was 13.3, asymptomatic from any acute bleeding.  Has RUQ and R flank tenderness, not peritonitic on exam.  Denies nausea or vomiting.   (23 Oct 2021 02:06)      PAST MEDICAL & SURGICAL HISTORY:    [ x ] No significant past history as reviewed with the patient and family    FAMILY HISTORY:    [ x ] Family history not pertinent as reviewed with the patient and family    SOCIAL HISTORY:    Medications (inpatient): famotidine    Tablet 20 milliGRAM(s) Oral every 12 hours  lactated ringers. 1000 milliLiter(s) IV Continuous <Continuous>  lidocaine   4% Patch 1 Patch Transdermal every 24 hours  potassium chloride    Tablet ER 40 milliEquivalent(s) Oral every 4 hours    Medications (PRN):acetaminophen     Tablet .. 650 milliGRAM(s) Oral every 6 hours PRN  oxyCODONE    IR 5 milliGRAM(s) Oral every 4 hours PRN    Allergies: No Known Allergies  (Intolerances: )    Vital Signs Last 24 Hrs  T(C): 36.7 (23 Oct 2021 11:25), Max: 37.1 (22 Oct 2021 23:24)  T(F): 98 (23 Oct 2021 11:25), Max: 98.7 (22 Oct 2021 23:24)  HR: 68 (23 Oct 2021 11:25) (62 - 75)  BP: 119/74 (23 Oct 2021 11:25) (105/65 - 146/72)  BP(mean): 78 (23 Oct 2021 05:03) (78 - 96)  RR: 18 (23 Oct 2021 11:25) (16 - 18)  SpO2: 95% (23 Oct 2021 11:25) (95% - 98%)  Drug Dosing Weight    Weight (kg): 51.5 (22 Oct 2021 19:14)    PHYSICAL EXAM  GEN: NAD, resting quietly  HEENT: NCAT  NEURO: awake, alert  PULM: symmetric chest rise bilaterally, right lower chest wall tenderness, no crepitus  CV: regular rate, peripheral pulses intact  GI: soft, NTND  EXTR: no cyanosis or edema, moving all extremities, no deformity, no tenderness                          13.3   6.77  )-----------( 216      ( 23 Oct 2021 13:42 )             39.5     10-    141  |  103  |  13.8  ----------------------------<  97  3.5   |  24.0  |  0.51    Ca    8.3<L>      23 Oct 2021 04:03  Phos  3.6     10-  Mg     2.2     10    TPro  7.3  /  Alb  4.5  /  TBili  0.4  /  DBili  x   /  AST  23  /  ALT  24  /  AlkPhos  57  10-    PT/INR - ( 23 Oct 2021 04:01 )   PT: 11.7 sec;   INR: 1.01 ratio         PTT - ( 23 Oct 2021 04:01 )  PTT:29.0 sec  Urinalysis Basic - ( 22 Oct 2021 23:45 )    Color: Yellow / Appearance: Clear / S.005 / pH: x  Gluc: x / Ketone: Negative  / Bili: Negative / Urobili: Negative mg/dL   Blood: x / Protein: Negative mg/dL / Nitrite: Negative   Leuk Esterase: Negative / RBC: 0-2 /HPF / WBC 0-2   Sq Epi: x / Non Sq Epi: Occasional / Bacteria: Occasional        List Injuries Identified to Date:  R 10th rib fx  Grade 1-2 liver lac    List Operative and Interventional Radiological Procedures:     Consults (Date):  [  ] Neurosurgery   [  ] Orthopedics  [  ] Plastics  [  ] Urology  [  ] PM&R  [  ] Social Work    RADIOLOGICAL FINDINGS REVIEW:  All CT of primary survey performed at other facility. No other imaging necessary on tertiary exam    HPI:  Transferred from [facility] after a fall down stairs, was assessed at [place] and notably had a R 10th rib fracture, grade 1-2 liver laceration and transferred to [facility] for higher level care.  Patient had a stable hemoglobin on admission at 14, which was repeated 6 hours after and was 13.3, asymptomatic from any acute bleeding.  Has RUQ and R flank tenderness, not peritonitic on exam.  Denies nausea or vomiting.   (23 Oct 2021 02:06)  .  Tertiary exam performed []. Findings as above. No additional injuries identified.    PLAN  - Pain control  - Diet  - Continue monitoring h/h for liver lac  - Continue monitoring rib fx and pic score  - F/u with PT consult

## 2021-10-23 NOTE — H&P ADULT - ASSESSMENT
66 year old F, HTN and DM, fell down stairs, presented as a transfer from [place] for a higher level of care.    R 10th Rib fracture  Grade 1/2 Liver Laceration   H and H stable, vitals stable    - Serial H and H   - SAEs  - Restart home medications  - PIC protocol   - SCDs for DVT ppx until H and H confirmed stable

## 2021-10-23 NOTE — PROGRESS NOTE ADULT - ATTENDING COMMENTS
Agree with above assessment.  The patient was seen and examined by me.  The patient is with pain in the right flank.  She has no nausea, no vomit.  On exam, there is mild tenderness to palpation in the right upper quadrant and flank with no guarding or rebound.  Monitor H/H given liver laceration, continue with pain control.  PO as tolerated.

## 2021-10-23 NOTE — H&P ADULT - HISTORY OF PRESENT ILLNESS
Transferred from [facility] after a fall down stairs, was assessed at [place] and notably had a R 10th rib fracture, grade 1-2 liver laceration and transferred to [facility] for higher level care.  Patient had a stable hemoglobin on admission at 14, which was repeated 6 hours after and was 13.3, asymptomatic from any acute bleeding.  Has RUQ and R flank tenderness, not peritonitic on exam.  Denies nausea or vomiting.

## 2021-10-24 ENCOUNTER — TRANSCRIPTION ENCOUNTER (OUTPATIENT)
Age: 66
End: 2021-10-24

## 2021-10-24 VITALS
TEMPERATURE: 98 F | OXYGEN SATURATION: 96 % | DIASTOLIC BLOOD PRESSURE: 82 MMHG | RESPIRATION RATE: 18 BRPM | SYSTOLIC BLOOD PRESSURE: 126 MMHG | HEART RATE: 75 BPM

## 2021-10-24 LAB
ANION GAP SERPL CALC-SCNC: 14 MMOL/L — SIGNIFICANT CHANGE UP (ref 5–17)
BUN SERPL-MCNC: 15.9 MG/DL — SIGNIFICANT CHANGE UP (ref 8–20)
CALCIUM SERPL-MCNC: 8.4 MG/DL — LOW (ref 8.6–10.2)
CHLORIDE SERPL-SCNC: 105 MMOL/L — SIGNIFICANT CHANGE UP (ref 98–107)
CO2 SERPL-SCNC: 22 MMOL/L — SIGNIFICANT CHANGE UP (ref 22–29)
CREAT SERPL-MCNC: 0.47 MG/DL — LOW (ref 0.5–1.3)
GLUCOSE SERPL-MCNC: 89 MG/DL — SIGNIFICANT CHANGE UP (ref 70–99)
HCV AB S/CO SERPL IA: 0.14 S/CO — SIGNIFICANT CHANGE UP (ref 0–0.99)
HCV AB SERPL-IMP: SIGNIFICANT CHANGE UP
MAGNESIUM SERPL-MCNC: 2.2 MG/DL — SIGNIFICANT CHANGE UP (ref 1.6–2.6)
PHOSPHATE SERPL-MCNC: 2.8 MG/DL — SIGNIFICANT CHANGE UP (ref 2.4–4.7)
POTASSIUM SERPL-MCNC: 4.5 MMOL/L — SIGNIFICANT CHANGE UP (ref 3.5–5.3)
POTASSIUM SERPL-SCNC: 4.5 MMOL/L — SIGNIFICANT CHANGE UP (ref 3.5–5.3)
SODIUM SERPL-SCNC: 141 MMOL/L — SIGNIFICANT CHANGE UP (ref 135–145)
TROPONIN T SERPL-MCNC: <0.01 NG/ML — SIGNIFICANT CHANGE UP (ref 0–0.06)

## 2021-10-24 PROCEDURE — 85610 PROTHROMBIN TIME: CPT

## 2021-10-24 PROCEDURE — 86900 BLOOD TYPING SEROLOGIC ABO: CPT

## 2021-10-24 PROCEDURE — 86803 HEPATITIS C AB TEST: CPT

## 2021-10-24 PROCEDURE — 86769 SARS-COV-2 COVID-19 ANTIBODY: CPT

## 2021-10-24 PROCEDURE — 85025 COMPLETE CBC W/AUTO DIFF WBC: CPT

## 2021-10-24 PROCEDURE — 85018 HEMOGLOBIN: CPT

## 2021-10-24 PROCEDURE — 85027 COMPLETE CBC AUTOMATED: CPT

## 2021-10-24 PROCEDURE — 99291 CRITICAL CARE FIRST HOUR: CPT

## 2021-10-24 PROCEDURE — 86901 BLOOD TYPING SEROLOGIC RH(D): CPT

## 2021-10-24 PROCEDURE — U0003: CPT

## 2021-10-24 PROCEDURE — 99231 SBSQ HOSP IP/OBS SF/LOW 25: CPT | Mod: GC

## 2021-10-24 PROCEDURE — 36415 COLL VENOUS BLD VENIPUNCTURE: CPT

## 2021-10-24 PROCEDURE — 85014 HEMATOCRIT: CPT

## 2021-10-24 PROCEDURE — 93005 ELECTROCARDIOGRAM TRACING: CPT

## 2021-10-24 PROCEDURE — 83735 ASSAY OF MAGNESIUM: CPT

## 2021-10-24 PROCEDURE — 80053 COMPREHEN METABOLIC PANEL: CPT

## 2021-10-24 PROCEDURE — 97163 PT EVAL HIGH COMPLEX 45 MIN: CPT

## 2021-10-24 PROCEDURE — 84484 ASSAY OF TROPONIN QUANT: CPT

## 2021-10-24 PROCEDURE — 81001 URINALYSIS AUTO W/SCOPE: CPT

## 2021-10-24 PROCEDURE — U0005: CPT

## 2021-10-24 PROCEDURE — 85730 THROMBOPLASTIN TIME PARTIAL: CPT

## 2021-10-24 PROCEDURE — 84100 ASSAY OF PHOSPHORUS: CPT

## 2021-10-24 PROCEDURE — 86850 RBC ANTIBODY SCREEN: CPT

## 2021-10-24 PROCEDURE — 80048 BASIC METABOLIC PNL TOTAL CA: CPT

## 2021-10-24 RX ORDER — ACETAMINOPHEN 500 MG
2 TABLET ORAL
Qty: 0 | Refills: 0 | DISCHARGE
Start: 2021-10-24

## 2021-10-24 RX ORDER — OXYCODONE HYDROCHLORIDE 5 MG/1
1 TABLET ORAL
Qty: 18 | Refills: 0
Start: 2021-10-24 | End: 2021-10-26

## 2021-10-24 RX ORDER — FAMOTIDINE 10 MG/ML
1 INJECTION INTRAVENOUS
Qty: 0 | Refills: 0 | DISCHARGE
Start: 2021-10-24

## 2021-10-24 RX ORDER — INFLUENZA VIRUS VACCINE 15; 15; 15; 15 UG/.5ML; UG/.5ML; UG/.5ML; UG/.5ML
0.5 SUSPENSION INTRAMUSCULAR ONCE
Refills: 0 | Status: DISCONTINUED | OUTPATIENT
Start: 2021-10-24 | End: 2021-10-24

## 2021-10-24 RX ADMIN — FAMOTIDINE 20 MILLIGRAM(S): 10 INJECTION INTRAVENOUS at 17:28

## 2021-10-24 RX ADMIN — LIDOCAINE 1 PATCH: 4 CREAM TOPICAL at 13:47

## 2021-10-24 RX ADMIN — FAMOTIDINE 20 MILLIGRAM(S): 10 INJECTION INTRAVENOUS at 06:17

## 2021-10-24 NOTE — PROGRESS NOTE ADULT - REASON FOR ADMISSION
Fall down stairs, R 10th rib fracture, Grade 1-2 liver laceration
Fall down stairs, R 10th rib fracture, Grade 1-2 liver laceration

## 2021-10-24 NOTE — DISCHARGE NOTE NURSING/CASE MANAGEMENT/SOCIAL WORK - PATIENT PORTAL LINK FT
You can access the FollowMyHealth Patient Portal offered by [facility] by registering at the following website: [email or web address]. By joining [employer]’s FollowMyHealth portal, you will also be able to view your health information using other applications (apps) compatible with our system.

## 2021-10-24 NOTE — PROGRESS NOTE ADULT - SUBJECTIVE AND OBJECTIVE BOX
Trauma/ACS    SUBJECTIVE: Pt seen and examined on rounds with team. No acute events overnight.  Pt. with c/o Right upper quadrant tenderness. Trending Hgb which has been stable so far.      OBJECTIVE    PHYSICAL EXAM:   General: AOX3, NAD, Lying in bed   Neuro: Awake and alert  Extremities: FROM all extremities, no C/C/E  GI/Abd: Soft, RUQ tenderness to moderate palpation around fractured right 10th rib.        VITALS  T(C): 36.7 (10-23-21 @ 11:25), Max: 37.1 (10-22-21 @ 23:24)  HR: 68 (10-23-21 @ 11:25) (62 - 75)  BP: 119/74 (10-23-21 @ 11:25) (105/65 - 146/72)  RR: 18 (10-23-21 @ 11:25) (16 - 18)  SpO2: 95% (10-23-21 @ 11:25) (95% - 98%)  CAPILLARY BLOOD GLUCOSE          Is/Os    10-22 @ 07:01  -  10-23 @ 07:00  --------------------------------------------------------  IN:  Total IN: 0 mL    OUT:    Voided (mL): 400 mL  Total OUT: 400 mL    Total NET: -400 mL          MEDICATIONS (STANDING): famotidine    Tablet 20 milliGRAM(s) Oral every 12 hours  lactated ringers. 1000 milliLiter(s) IV Continuous <Continuous>  potassium chloride    Tablet ER 40 milliEquivalent(s) Oral every 4 hours    MEDICATIONS (PRN):acetaminophen     Tablet .. 650 milliGRAM(s) Oral every 6 hours PRN Mild Pain (1 - 3)  oxyCODONE    IR 5 milliGRAM(s) Oral every 4 hours PRN Severe Pain (7 - 10)      LABS  CBC (10-23 @ 13:42)                              13.3                           6.77    )----------------(  216        --    % Neutrophils, --    % Lymphocytes, ANC: --                                  39.5    CBC (10-23 @ 04:03)                              13.0                           7.25    )----------------(  209        60.0  % Neutrophils, 29.8  % Lymphocytes, ANC: 4.35                                38.4      BMP (10-23 @ 04:03)             141     |  103     |  13.8  		Ca++ --      Ca 8.3<L>             ---------------------------------( 97    		Mg --                 3.5     |  24.0    |  0.51  			Ph --      BMP (10-23 @ 02:05)             141     |  103     |  14.0  		Ca++ --      Ca 8.3<L>             ---------------------------------( 103<H>		Mg 2.2                3.5     |  23.0    |  0.42<L>			Ph 3.6       LFTs (10-23 @ 04:03)      TPro 7.3 / Alb 4.5 / TBili 0.4 / DBili -- / AST 23 / ALT 24 / AlkPhos 57  LFTs (10-23 @ 02:05)      TPro 7.5 / Alb 4.6 / TBili 0.3<L> / DBili -- / AST 23 / ALT 24 / AlkPhos 58    Coags (10-23 @ 04:01)  aPTT 29.0 / INR 1.01 / PT 11.7  Coags (10-22 @ 19:33)  aPTT 30.9 / INR 0.96 / PT 11.2            IMAGING STUDIES    
Subjective:    Pt seen and examined on rounds with team. No acute events overnight.  Pt. with c/o Right upper quadrant tenderness.     MEDICATIONS  (STANDING):  famotidine    Tablet 20 milliGRAM(s) Oral every 12 hours  lactated ringers. 1000 milliLiter(s) (42 mL/Hr) IV Continuous <Continuous>  lidocaine   4% Patch 1 Patch Transdermal every 24 hours    MEDICATIONS  (PRN):  acetaminophen     Tablet .. 650 milliGRAM(s) Oral every 6 hours PRN Mild Pain (1 - 3)  oxyCODONE    IR 5 milliGRAM(s) Oral every 4 hours PRN Severe Pain (7 - 10)      Vital Signs Last 24 Hrs  T(C): 36.6 (24 Oct 2021 04:19), Max: 36.8 (23 Oct 2021 16:09)  T(F): 97.8 (24 Oct 2021 04:19), Max: 98.3 (23 Oct 2021 16:09)  HR: 62 (24 Oct 2021 04:19) (62 - 77)  BP: 131/78 (24 Oct 2021 04:19) (119/74 - 133/74)  BP(mean): --  RR: 18 (24 Oct 2021 04:19) (16 - 18)  SpO2: 97% (24 Oct 2021 04:19) (94% - 97%)      10-22  -  10-23  --------------------------------------------------------  IN:  Total IN: 0 mL    OUT:    Voided (mL): 400 mL  Total OUT: 400 mL    Total NET: -400 mL          Physical Exam:    PHYSICAL EXAM:   General: AOX3, NAD, Lying in bed   Neuro: Awake and alert  Extremities: FROM all extremities, no C/C/E  GI/Abd: Soft, RUQ tenderness to moderate palpation around fractured right 10th rib.      LABS:                        12.9   6.97  )-----------( 208      ( 23 Oct 2021 19:22 )             38.7     10-23    141  |  103  |  13.8  ----------------------------<  97  3.5   |  24.0  |  0.51    Ca    8.3<L>      23 Oct 2021 04:03  Phos  3.6     10-  Mg     2.2     10-    TPro  7.3  /  Alb  4.5  /  TBili  0.4  /  DBili  x   /  AST  23  /  ALT  24  /  AlkPhos  57  10-    PT/INR - ( 23 Oct 2021 04:01 )   PT: 11.7 sec;   INR: 1.01 ratio         PTT - ( 23 Oct 2021 04:01 )  PTT:29.0 sec  Urinalysis Basic - ( 22 Oct 2021 23:45 )    Color: Yellow / Appearance: Clear / S.005 / pH: x  Gluc: x / Ketone: Negative  / Bili: Negative / Urobili: Negative mg/dL   Blood: x / Protein: Negative mg/dL / Nitrite: Negative   Leuk Esterase: Negative / RBC: 0-2 /HPF / WBC 0-2   Sq Epi: x / Non Sq Epi: Occasional / Bacteria: Occasional

## 2021-10-24 NOTE — PROGRESS NOTE ADULT - ATTENDING COMMENTS
Agree with above assessment.  The patient was seen and examined by me.  The patient was interviewed with the aid of a Mandarin . The patient reports pain along the right chest wall with radiation to the base of the neck.  On exam there is no cervical tenderness or step offs, trachea is midline, there is mild right chest wall tenderness, abdomen is soft and non tender.  Will ambulate with PT, if stable then discharge planning for home.

## 2021-10-24 NOTE — DISCHARGE NOTE PROVIDER - HOSPITAL COURSE
67 y/o woman transferred from [place] for higher level of care.  Pt. presented to [place] after a fall down stairs.  Imaging at [place] was sig. for fractured right 10th rib and grade 1-2 liver lac.  Admitted for serial cbc's to monitor for bleeding from liver.  Patient had a stable hemoglobin on admission at 14, which was repeated 6 hours after and was 13.3, asymptomatic from any acute bleeding.  Has RUQ and R flank tenderness, not peritonitic on exam.  Denies nausea or vomiting.    Pt. further monitored for hemodynamics in the hospital and for serial cbc's.  Pt. has remained hemodynamically stable throughout her stay and her Hgb has remained stable.  Pt. is ambulating, tolerating a diet and is stable for discharge.    Time spent on discharge >30 min.

## 2021-10-24 NOTE — DISCHARGE NOTE PROVIDER - NSDCCPCAREPLAN_GEN_ALL_CORE_FT
PRINCIPAL DISCHARGE DIAGNOSIS  Diagnosis: Liver laceration, closed  Assessment and Plan of Treatment: Follow up: Please call and make an appointment with the Acute Care Surgery Clinic 10-14 days after discharge. Also, please call and make an appointment with your primary care physician as per your usual schedule.   Activity: May return to normal activities as tolerated, however refrain from heavy lifting > 10-15 lbs.  Diet: May continue regular diet.  Medications: Please take all medications listed on your discharge paperwork as prescribed. Pain medication has been prescribed for you. Please, take it as it has been prescribed, do not drive or operate heavy machinery while taking narcotics.  You are encouraged to take over-the-counter tylenol and/or ibuprofen for pain relief when you feel your pain no longer warrants the use of narcotic pain medications.  Patient is advised to RETURN TO THE EMERGENCY DEPARTMENT for any of the following - worsening pain, fever/chills, nausea/vomiting, altered mental status, chest pain, shortness of breath, or any other new / worsening symptom.        SECONDARY DISCHARGE DIAGNOSES  Diagnosis: Rib fracture  Assessment and Plan of Treatment:

## 2021-10-24 NOTE — PROGRESS NOTE ADULT - ASSESSMENT
· Assessment	  65 y/o female s/p fall with right 10th rib fx and grade 1-2 liver lac.  Stable HGB    plan:  -continue to trend serial cbc's  -OOB to chair  -PT evaluation  -IS 10 breaths qh  - Pain control  
67 y/o female s/p fall with right 10th rib fx and grade 1-2 liver lac.  Stable HGB  -continue to trend serial cbc's  -OOB to chair  -PT evaluation  -IS 10 breaths qh  -continue PO analgesia meds

## 2021-10-24 NOTE — DISCHARGE NOTE PROVIDER - CARE PROVIDER_API CALL
Alexis Cox)  Surgery  General  [address] [place] [id number]  Phone: [phone]  Fax: [phone]  Follow Up Time: 2 weeks

## 2021-10-24 NOTE — DISCHARGE NOTE PROVIDER - NSFOLLOWUPCLINICS_GEN_ALL_ED_FT
[facility] Acute Care Surgery  Acute Care Surgery  [address] [place] [id number]  Phone: [phone]  Fax:

## 2021-11-09 ENCOUNTER — APPOINTMENT (OUTPATIENT)
Dept: TRAUMA SURGERY | Facility: CLINIC | Age: 66
End: 2021-11-09
Payer: MEDICAID

## 2021-11-09 VITALS
TEMPERATURE: 97.7 F | HEART RATE: 72 BPM | OXYGEN SATURATION: 96 % | BODY MASS INDEX: 22.38 KG/M2 | HEIGHT: 59 IN | WEIGHT: 111 LBS | DIASTOLIC BLOOD PRESSURE: 85 MMHG | SYSTOLIC BLOOD PRESSURE: 122 MMHG | RESPIRATION RATE: 16 BRPM

## 2021-11-09 DIAGNOSIS — Z82.49 FAMILY HISTORY OF ISCHEMIC HEART DISEASE AND OTHER DISEASES OF THE CIRCULATORY SYSTEM: ICD-10-CM

## 2021-11-09 DIAGNOSIS — S22.41XD MULTIPLE FRACTURES OF RIBS, RIGHT SIDE, SUBSEQUENT ENCOUNTER FOR FRACTURE WITH ROUTINE HEALING: ICD-10-CM

## 2021-11-09 DIAGNOSIS — S36.114D: ICD-10-CM

## 2021-11-09 DIAGNOSIS — I10 ESSENTIAL (PRIMARY) HYPERTENSION: ICD-10-CM

## 2021-11-09 DIAGNOSIS — Z78.9 OTHER SPECIFIED HEALTH STATUS: ICD-10-CM

## 2021-11-09 PROBLEM — Z00.00 ENCOUNTER FOR PREVENTIVE HEALTH EXAMINATION: Status: ACTIVE | Noted: 2021-11-09

## 2021-11-09 PROCEDURE — 99203 OFFICE O/P NEW LOW 30 MIN: CPT

## 2021-11-09 PROCEDURE — 99213 OFFICE O/P EST LOW 20 MIN: CPT

## 2021-11-09 NOTE — HISTORY OF PRESENT ILLNESS
[de-identified] : right 7-8 anterolateral nondisplaced rib fractures\par grade 1 segment 6 liver laceration\par  [de-identified] : pain has improved\par no dyspnea\par

## 2021-11-09 NOTE — PHYSICAL EXAM
[Calm] : calm [de-identified] : appears well [de-identified] : no respiratory distress [de-identified] : soft / NT / ND [de-identified] : mild right anterolateral chest wall tenderness without crepitus or ecchymosis [de-identified] : no jaundice. no pallor.

## 2021-11-09 NOTE — PLAN
[FreeTextEntry1] : The patient had the following significant incidental findings on her 10/22/2021 1654 CT abd/pelvis w/ contrast:\par 1. 2 mm pancreatic head sidebranch IPMN\par 2. indeterminant 10 mm left midpole kidney lesion\par \par I printed this CT scan report, highlighted these findings, and provided them to the patient. She agreed to f/u with her PMD (Octaviano Abbott [phone]).

## 2021-11-09 NOTE — REASON FOR VISIT
[Post Hospitalization] : a post hospitalization visit [Other: _____] : [unfilled] [Source: ______] : History obtained from [unfilled] [FreeTextEntry1] : admitted 10/22 - 10/24/2021 for injuries after a fall down stairs

## 2022-02-10 NOTE — PHYSICAL EXAM
[Well Developed] : well developed [Well Nourished] : well nourished [No Acute Distress] : no acute distress [Normal Conjunctiva] : normal conjunctiva [Normal Venous Pressure] : normal venous pressure [No Carotid Bruit] : no carotid bruit [Normal S1, S2] : normal S1, S2 [No Rub] : no rub [No Gallop] : no gallop [Clear Lung Fields] : clear lung fields [Good Air Entry] : good air entry [No Respiratory Distress] : no respiratory distress  [Soft] : abdomen soft [Non Tender] : non-tender [No Masses/organomegaly] : no masses/organomegaly [Normal Bowel Sounds] : normal bowel sounds [Normal Gait] : normal gait [No Edema] : no edema [No Cyanosis] : no cyanosis [No Clubbing] : no clubbing [No Varicosities] : no varicosities [Moves all extremities] : moves all extremities [No Focal Deficits] : no focal deficits [Normal Speech] : normal speech [Alert and Oriented] : alert and oriented [Normal memory] : normal memory [Murmur] : murmur [de-identified] : 2/6 ANSHU at apex

## 2022-02-10 NOTE — DISCUSSION/SUMMARY
[FreeTextEntry1] : 66-year-old female with HTN, hyperlipidemia, h/o tachycardia s/p ablation, hiatal hernia, hyperglycemia, osteoporosis, presents for followup. \par \par Patient was last seen on 7/28/21 for syncope. Patient underwent an echocardiogram and it showed normal LV function without significant valvular pathology.  Patient wore a Holter and it showed rare APC's, rare PVC's, with episodes of 2:1 AV block at night as well as type I second degree AV block.  I advised patient that she may need a PPM if she has recurrent syncope.\par \par 8/10/21 - Patient reports that 2 nights ago she had lower CP after she woke up to use bathroom and felt better after drinking water. Patient reports belching and felt better.  I advised patient to undergo a treadmill stress test. Patient underwent a treadmill stress test and completed 10 minutes of Marcelino protocol.  There were upsloping ST depressions on ECG but no symptoms.  Following treadmill stress, there was no echocardiographic evidence of ischemia. \par \par (1) Chest discomfort, non-exertional - Patient underwent a treadmill stress test and completed 10 minutes of Marcelino protocol.  There were upsloping ST depressions on ECG but no symptoms.  Following treadmill stress, there was no echocardiographic evidence of ischemia. Patient was reassured.  Her symptom may be GI in etiology. \par \par (2) Recent syncope -  Patient underwent an echocardiogram and it showed normal LV function without significant valvular pathology.  Patient wore a Holter and it showed rare APC's, rare PVC's, with episodes of 2:1 AV block at night as well as type I second degree AV block.  Her syncope was likely vasovagal in etiology but she may need a PPM if she has recurrent syncope.\par \par (3) Followup - 1 year.

## 2022-02-10 NOTE — REASON FOR VISIT
[FreeTextEntry1] : 66-year-old female with HTN, hyperlipidemia, h/o tachycardia s/p ablation, hiatal hernia, hyperglycemia, osteoporosis, presents for followup. \par \par Patient was last seen on 7/28/21 for syncope. Patient underwent an echocardiogram and it showed normal LV function without significant valvular pathology.  Patient wore a Holter and it showed rare APC's, rare PVC's, with episodes of 2:1 AV block at night as well as type I second degree AV block.  I advised patient that she may need a PPM if she has recurrent syncope.\par \par \par

## 2022-02-10 NOTE — HISTORY OF PRESENT ILLNESS
[FreeTextEntry1] : 8/10/21 - Patient reports that 2 nights ago she had lower CP after she woke up to use bathroom and felt better after drinking water. Patient reports belching and felt better.  I advised patient to undergo a treadmill stress test. Patient underwent a treadmill stress test and completed 10 minutes of Marcelino protocol.  There were upsloping ST depressions on ECG but no symptoms.  Following treadmill stress, there was no echocardiographic evidence of ischemia. \par \par 7/28/21 - Patient had recent episode of syncope when she had stomach ache using the bathroom. Patient hit her head and was unconscious with diaphoresis. Patient reports palpitations. Patient denies CP. Patient denies SOB.  Patient had an episode of vertigo with vomiting 1 month ago. Her BP was elevated at the time and she went to ER. She had head CT which was negative. She has been having headaches since. I advised patient to undergo an echocardiogram. I advised patient to wear a Holter monitor.  Patient underwent an echocardiogram and it showed normal LV function without significant valvular pathology.  Patient wore a Holter and it showed rare APC's, rare PVC's, with episodes of 2:1 AV block at night as well as type I second degree AV block.  Her syncope was likely vasovagal in etiology but she may need a PPM if she has recurrent syncope.\par \par 5/28/15 - Patient reports that for the past week she has been experiencing upper and substernal chest discomfort, described as tightness, not related to exertion or to meals, relieved with hiccups. She denies SOB with exertion. She denies palpitations ever since ablation. She denies h/o syncope.

## 2022-02-12 ENCOUNTER — FORM ENCOUNTER (OUTPATIENT)
Age: 67
End: 2022-02-12

## 2022-03-01 ENCOUNTER — EMERGENCY (EMERGENCY)
Facility: HOSPITAL | Age: 67
LOS: 1 days | Discharge: HOME | End: 2022-03-01
Attending: EMERGENCY MEDICINE | Admitting: EMERGENCY MEDICINE
Payer: MEDICAID

## 2022-03-01 VITALS
RESPIRATION RATE: 16 BRPM | WEIGHT: 113.98 LBS | HEART RATE: 72 BPM | OXYGEN SATURATION: 99 % | SYSTOLIC BLOOD PRESSURE: 143 MMHG | TEMPERATURE: 97 F | DIASTOLIC BLOOD PRESSURE: 93 MMHG | HEIGHT: 61 IN

## 2022-03-01 LAB
ALBUMIN SERPL ELPH-MCNC: 3.7 G/DL — SIGNIFICANT CHANGE UP (ref 3.3–5)
ALP SERPL-CCNC: 57 U/L — SIGNIFICANT CHANGE UP (ref 30–120)
ALT FLD-CCNC: 45 U/L DA — SIGNIFICANT CHANGE UP (ref 10–60)
ANION GAP SERPL CALC-SCNC: 8 MMOL/L — SIGNIFICANT CHANGE UP (ref 5–17)
APPEARANCE UR: CLEAR — SIGNIFICANT CHANGE UP
AST SERPL-CCNC: 27 U/L — SIGNIFICANT CHANGE UP (ref 10–40)
BACTERIA # UR AUTO: ABNORMAL
BASOPHILS # BLD AUTO: 0.05 K/UL — SIGNIFICANT CHANGE UP (ref 0–0.2)
BASOPHILS NFR BLD AUTO: 0.6 % — SIGNIFICANT CHANGE UP (ref 0–2)
BILIRUB SERPL-MCNC: 0.2 MG/DL — SIGNIFICANT CHANGE UP (ref 0.2–1.2)
BILIRUB UR-MCNC: NEGATIVE — SIGNIFICANT CHANGE UP
BUN SERPL-MCNC: 17 MG/DL — SIGNIFICANT CHANGE UP (ref 7–23)
CALCIUM SERPL-MCNC: 8.1 MG/DL — LOW (ref 8.4–10.5)
CHLORIDE SERPL-SCNC: 103 MMOL/L — SIGNIFICANT CHANGE UP (ref 96–108)
CO2 SERPL-SCNC: 23 MMOL/L — SIGNIFICANT CHANGE UP (ref 22–31)
COLOR SPEC: YELLOW — SIGNIFICANT CHANGE UP
CREAT SERPL-MCNC: 0.52 MG/DL — SIGNIFICANT CHANGE UP (ref 0.5–1.3)
DIFF PNL FLD: ABNORMAL
EGFR: 104 ML/MIN/1.73M2 — SIGNIFICANT CHANGE UP
EOSINOPHIL # BLD AUTO: 0.05 K/UL — SIGNIFICANT CHANGE UP (ref 0–0.5)
EOSINOPHIL NFR BLD AUTO: 0.6 % — SIGNIFICANT CHANGE UP (ref 0–6)
EPI CELLS # UR: SIGNIFICANT CHANGE UP
GLUCOSE SERPL-MCNC: 135 MG/DL — HIGH (ref 70–99)
GLUCOSE UR QL: NEGATIVE MG/DL — SIGNIFICANT CHANGE UP
HCT VFR BLD CALC: 40.3 % — SIGNIFICANT CHANGE UP (ref 34.5–45)
HGB BLD-MCNC: 13.2 G/DL — SIGNIFICANT CHANGE UP (ref 11.5–15.5)
IMM GRANULOCYTES NFR BLD AUTO: 0.1 % — SIGNIFICANT CHANGE UP (ref 0–1.5)
KETONES UR-MCNC: NEGATIVE — SIGNIFICANT CHANGE UP
LEUKOCYTE ESTERASE UR-ACNC: NEGATIVE — SIGNIFICANT CHANGE UP
LYMPHOCYTES # BLD AUTO: 1.29 K/UL — SIGNIFICANT CHANGE UP (ref 1–3.3)
LYMPHOCYTES # BLD AUTO: 16.6 % — SIGNIFICANT CHANGE UP (ref 13–44)
MCHC RBC-ENTMCNC: 32 PG — SIGNIFICANT CHANGE UP (ref 27–34)
MCHC RBC-ENTMCNC: 32.8 GM/DL — SIGNIFICANT CHANGE UP (ref 32–36)
MCV RBC AUTO: 97.6 FL — SIGNIFICANT CHANGE UP (ref 80–100)
MONOCYTES # BLD AUTO: 0.3 K/UL — SIGNIFICANT CHANGE UP (ref 0–0.9)
MONOCYTES NFR BLD AUTO: 3.9 % — SIGNIFICANT CHANGE UP (ref 2–14)
NEUTROPHILS # BLD AUTO: 6.05 K/UL — SIGNIFICANT CHANGE UP (ref 1.8–7.4)
NEUTROPHILS NFR BLD AUTO: 78.2 % — HIGH (ref 43–77)
NITRITE UR-MCNC: NEGATIVE — SIGNIFICANT CHANGE UP
NRBC # BLD: 0 /100 WBCS — SIGNIFICANT CHANGE UP (ref 0–0)
PH UR: 8 — SIGNIFICANT CHANGE UP (ref 5–8)
PLATELET # BLD AUTO: 182 K/UL — SIGNIFICANT CHANGE UP (ref 150–400)
POTASSIUM SERPL-MCNC: 3.6 MMOL/L — SIGNIFICANT CHANGE UP (ref 3.5–5.3)
POTASSIUM SERPL-SCNC: 3.6 MMOL/L — SIGNIFICANT CHANGE UP (ref 3.5–5.3)
PROT SERPL-MCNC: 7.9 G/DL — SIGNIFICANT CHANGE UP (ref 6–8.3)
PROT UR-MCNC: NEGATIVE MG/DL — SIGNIFICANT CHANGE UP
RBC # BLD: 4.13 M/UL — SIGNIFICANT CHANGE UP (ref 3.8–5.2)
RBC # FLD: 12.4 % — SIGNIFICANT CHANGE UP (ref 10.3–14.5)
RBC CASTS # UR COMP ASSIST: SIGNIFICANT CHANGE UP /HPF (ref 0–4)
SODIUM SERPL-SCNC: 134 MMOL/L — LOW (ref 135–145)
SP GR SPEC: 1.01 — SIGNIFICANT CHANGE UP (ref 1.01–1.02)
TROPONIN I, HIGH SENSITIVITY RESULT: 5.9 NG/L — SIGNIFICANT CHANGE UP
UROBILINOGEN FLD QL: NEGATIVE MG/DL — SIGNIFICANT CHANGE UP
WBC # BLD: 7.75 K/UL — SIGNIFICANT CHANGE UP (ref 3.8–10.5)
WBC # FLD AUTO: 7.75 K/UL — SIGNIFICANT CHANGE UP (ref 3.8–10.5)
WBC UR QL: SIGNIFICANT CHANGE UP

## 2022-03-01 PROCEDURE — 36415 COLL VENOUS BLD VENIPUNCTURE: CPT

## 2022-03-01 PROCEDURE — 99285 EMERGENCY DEPT VISIT HI MDM: CPT | Mod: 25

## 2022-03-01 PROCEDURE — 93010 ELECTROCARDIOGRAM REPORT: CPT

## 2022-03-01 PROCEDURE — G1004: CPT

## 2022-03-01 PROCEDURE — 70450 CT HEAD/BRAIN W/O DYE: CPT | Mod: 26,MG

## 2022-03-01 PROCEDURE — 96374 THER/PROPH/DIAG INJ IV PUSH: CPT

## 2022-03-01 PROCEDURE — 84484 ASSAY OF TROPONIN QUANT: CPT

## 2022-03-01 PROCEDURE — 85025 COMPLETE CBC W/AUTO DIFF WBC: CPT

## 2022-03-01 PROCEDURE — 93005 ELECTROCARDIOGRAM TRACING: CPT

## 2022-03-01 PROCEDURE — 99284 EMERGENCY DEPT VISIT MOD MDM: CPT

## 2022-03-01 PROCEDURE — 80053 COMPREHEN METABOLIC PANEL: CPT

## 2022-03-01 PROCEDURE — 81001 URINALYSIS AUTO W/SCOPE: CPT

## 2022-03-01 PROCEDURE — 70450 CT HEAD/BRAIN W/O DYE: CPT | Mod: MG

## 2022-03-01 RX ORDER — MECLIZINE HCL 12.5 MG
25 TABLET ORAL ONCE
Refills: 0 | Status: COMPLETED | OUTPATIENT
Start: 2022-03-01 | End: 2022-03-01

## 2022-03-01 RX ORDER — MECLIZINE HCL 12.5 MG
1 TABLET ORAL
Qty: 12 | Refills: 0
Start: 2022-03-01 | End: 2022-03-04

## 2022-03-01 RX ORDER — ONDANSETRON 8 MG/1
4 TABLET, FILM COATED ORAL ONCE
Refills: 0 | Status: COMPLETED | OUTPATIENT
Start: 2022-03-01 | End: 2022-03-01

## 2022-03-01 RX ORDER — SODIUM CHLORIDE 9 MG/ML
1000 INJECTION INTRAMUSCULAR; INTRAVENOUS; SUBCUTANEOUS ONCE
Refills: 0 | Status: COMPLETED | OUTPATIENT
Start: 2022-03-01 | End: 2022-03-01

## 2022-03-01 RX ADMIN — SODIUM CHLORIDE 1000 MILLILITER(S): 9 INJECTION INTRAMUSCULAR; INTRAVENOUS; SUBCUTANEOUS at 17:04

## 2022-03-01 RX ADMIN — ONDANSETRON 4 MILLIGRAM(S): 8 TABLET, FILM COATED ORAL at 17:04

## 2022-03-01 RX ADMIN — Medication 25 MILLIGRAM(S): at 16:08

## 2022-03-01 NOTE — ED ADULT NURSE NOTE - OBJECTIVE STATEMENT
AAOx3 c/o dizziness and nausea x noon today. Denies trauma, fall, CP, SOB. No active vomiting noted. Patient on cardiac monitor. No distress stated. CT completed. Given med as ordered by provider. Will continue to assess.

## 2022-03-01 NOTE — ED ADULT NURSE NOTE - NSIMPLEMENTINTERV_GEN_ALL_ED
Implemented All Fall Risk Interventions:  [place] to call system. Call bell, personal items and telephone within reach. Instruct patient to call for assistance. Room bathroom lighting operational. Non-slip footwear when patient is off stretcher. Physically safe environment: no spills, clutter or unnecessary equipment. Stretcher in lowest position, wheels locked, appropriate side rails in place. Provide visual cue, wrist band, yellow gown, etc. Monitor gait and stability. Monitor for mental status changes and reorient to person, place, and time. Review medications for side effects contributing to fall risk. Reinforce activity limits and safety measures with patient and family.

## 2022-03-01 NOTE — ED PROVIDER NOTE - NSICDXPASTMEDICALHX_GEN_ALL_CORE_FT
PAST MEDICAL HISTORY:  GERD (gastroesophageal reflux disease)     Hiatal hernia     History of tachycardia     HLD (hyperlipidemia)     HTN (hypertension)     Kidney stones     Osteoporosis

## 2022-03-01 NOTE — ED PROVIDER NOTE - OBJECTIVE STATEMENT
Mandarin  [id number]  66 F hx HTN, HLD c/o room-spinning dizziness since 12 PM after she woke from a nap. Worse with movement. Feels better when she closes her eyes and rests. Similar symptoms in the past, was seen in this ED for similar symptoms June 2021. Otherwise has been in usual state of health. Per daughter in law pt acting at her baseline with normal speech and no facial droop. Reports nausea. Denies syncope. Mandarin  [id number]  66 F hx HTN, HLD c/o room-spinning dizziness since 12 PM after she woke from a nap. Worse with movement. Feels better when she closes her eyes and rests. Similar symptoms in the past, was seen in this ED for similar symptoms June 2021. Otherwise has been in usual state of health. Per daughter in law pt acting at her baseline with normal speech and no facial droop. Reports nausea. Denies syncope.    PMD: Milena

## 2022-03-01 NOTE — ED PROVIDER NOTE - CLINICAL SUMMARY MEDICAL DECISION MAKING FREE TEXT BOX
66 F hx HTN, HLD c/o room-spinning dizziness since 12 PM after she woke from a nap. Worse with movement. Feels better when she closes her eyes and rests. Similar symptoms in the past, was seen in this ED for similar symptoms June 2021. Otherwise has been in usual state of health. Per daughter in law pt acting at her baseline with normal speech and no facial droop. Reports nausea. Denies syncope. -- nad, rrr, lungs clear, abd soft ntnd, nonfocal neuro -- ivf, meds, labs, ekg, ct head

## 2022-03-01 NOTE — ED PROVIDER NOTE - PATIENT PORTAL LINK FT
You can access the FollowMyHealth Patient Portal offered by [facility] by registering at the following website: [email or web address]. By joining [employer]’s FollowMyHealth portal, you will also be able to view your health information using other applications (apps) compatible with our system.

## 2022-03-01 NOTE — ED PROVIDER NOTE - PROGRESS NOTE DETAILS
Remy Urias   67 y/o female with PMHx of GERD, hiatal hernia, HLD, HTN, kidney stones, osteoporosis, tachycardia s/p ablation, s/p uterine myomectomy presents to the ED for evaluation of dizziness with associated nausea and vomiting. Symptoms began at about 12 PM after pt woke up from a nap. Pt had a similar episode about a year ago.    PE: Vital signs normal, afebrile, in no distress. PERRL, EOMI, no nystagmus. Neck: supple, non-tender. Heart and lungs normal. Abdomen: soft, non-tender, no masses. Extremities: FROM intact, no edema. Neuro: A&Ox3, cranial nerves intact, no sensor-motor deficits, reflexes, +2, gait normal, NIH SS=0, passes dysphagia screening.    Impression: Dizziness, likely labyrinthitis. Plan: Labs, CT brain, Meclizine, and reassess. Remy Urias   65 y/o female with PMHx of GERD, hiatal hernia, HLD, HTN, kidney stones, osteoporosis, tachycardia s/p ablation, s/p uterine myomectomy presents to the ED for evaluation of dizziness with associated nausea and vomiting. Symptoms began at about 12 PM after pt woke up from a nap. Pt had a similar episode about a year ago. Denies chest pain, headache. No other complaints at this time. Allergic to Penicillin.    PE: Vital signs normal, afebrile, in no distress. PERRL, EOMI, no nystagmus. Neck: supple, non-tender. Heart and lungs normal. Abdomen: soft, non-tender, no masses. Extremities: FROM intact, no edema. Neuro: A&Ox3, cranial nerves intact, no sensor-motor deficits, reflexes, +2, gait normal, NIH SS=0, passes dysphagia screening.    Impression: Dizziness, likely labyrinthitis. Plan: Labs, CT brain, Meclizine, and reassess. Reevaluated patient at bedside.  Patient feeling much better. Discussed the results of all diagnostic testing in ED and copies of all available reports given. Son at bedside. An opportunity to ask questions was provided.  Discussed the importance of prompt, close medical follow-up.  Patient will return with any changes, concerns or persistent/worsening symptoms.  Understanding of all instructions verbalized.

## 2022-03-02 DIAGNOSIS — O34.29 MATERNAL CARE DUE TO UTERINE SCAR FROM OTHER PREVIOUS SURGERY: Chronic | ICD-10-CM

## 2022-03-02 DIAGNOSIS — Z98.890 OTHER SPECIFIED POSTPROCEDURAL STATES: Chronic | ICD-10-CM

## 2022-03-06 ENCOUNTER — FORM ENCOUNTER (OUTPATIENT)
Age: 67
End: 2022-03-06

## 2022-04-29 NOTE — ED ADULT NURSE NOTE - PAIN RATING/NUMBER SCALE (0-10): REST
Psychotherapy Provided: Individual Psychotherapy 45 minutes     Length of time in session: 45 minutes, follow up as needed    Goals addressed in session: Goal 1     Pt presents to this writer with increase in depression over the last several weeks  Pt reports she lives with her  and 1year old daughter  Pt works full time, and  takes care of the three year old  Pt presents to this writer with unhappiness in her marriage, guilt over wanting to leave the marriage, and worry over what her future hold  Pt reports she has been in therapy once before with Ravi Rubin LCSW at [facility]  Pt reports she only went to one session  Pt reports she works full time at [employer] and she loves her work  Pt is very artsy and she also sings  Pt states she met her  through their mutual love of music  Pt reports over the last several months, pt has been unhappy in the relationship   is fearful and a little paranoid  Pt reports Covid did a number on him, and he got into this fearful zone in his life  Pt reports she distanced herself from him, and started getting involved with a colleague at work  Pt reports her  started recording her conversations and putting trail cams around the property  Pt reports eventually her  caught her kissing this male colleague and pt opened up about how much she wants to move on from her current relationship  Pt also reports her  was recording all her conversations through his computer and so he knows everything she is thinking  Pt reports she feels guilty, bad, morally struggling  Yet, pt knows she can't stay in the current relationship as she has fallen out of love with her , and they no longer sleep together or have any physical intimacy together  Pt reports she and her  are both from alcoholic and dysfuncational families  Pt reports her house was incredible volatile growing up   Pt states she has three sisters, and they don't talk to each other  Pt grew up in this area, and all her friends have moved out of the area  Pt reports she told herself she would never say in the [facility], but she met her  3 months before she was due to leave the area and move in with a friend of hers out of state  Pt reports she recognizes she has a lot of co-dependency issues from growing up in an alcoholic family  Pt has been listening to podcasts and trying to understand why she is the way she is  Pt reports she tends to have a strong flight response, and tends not to be confrontive  Pt reports she tends to allow things to keep going, which ultimately leads to everyone being unhappy  Pt reports she feels she needs to take care of people, and she struggles to be by herself  Pt reports she has tended to go from one relationship to the next  Pt reports much of her depression and anxiety is related to the fact she feels like a bad person  Pt reports, she is trying to do what is right for hers, but the guilt and shame complex is running a lot of interference  Pt reports she still has her mother and father in her life  None of the other siblings talk to their father because he was so emotionally and verbally abusive towards them  Pt states her mother still struggles, but she is good to pt's daughter  Pt reports she feels lonely in her current life  Pt states the man her  caught her cheating with is moving to [facility] so she has no hope or plan of bouncing into a new relatoinships with him  Pt states most of her friends from childhood have left the area  Pt reports she is not involved with anything outside of work and has not been pursuing any hobbies or interests  Pt reports she has been trying to work on her co-dependency concerns, and has been investing in educating herself about this  Pt is recommended to consider joining a ERIKA group in the area   Pt is also encouraged to explore some of the emotional and social problems children of alcoholic parents have  Pt is recommended to also explore mindfulness as a way to start to think about what she is doing in the presence, her responses, emotional responses and to start to think about whether there is an alternative  Pt reports she is not sure if she wants to pursue therapy at this time  This writer recommends pt start the process of educating herself regarding ERIKA and to follow up with this writer as needed if she needs a place to work on the depression, and any concerns that crop up  This writer also reviewed importance of self care, healthy coping mechanisms, exercise, diet, sleep hygiene, social contact  Pain:      none    0    Current suicide risk : Low     Pt is future oriented and not suicidal    Behavioral Health Treatment Plan [facility]: Diagnosis and Treatment Plan explained to Venkata Trinh relates understanding diagnosis and is agreeable to Treatment Plan   Yes 5

## 2022-07-05 ENCOUNTER — APPOINTMENT (OUTPATIENT)
Dept: CARDIOLOGY | Facility: CLINIC | Age: 67
End: 2022-07-05

## 2022-07-05 VITALS
SYSTOLIC BLOOD PRESSURE: 159 MMHG | RESPIRATION RATE: 18 BRPM | HEART RATE: 82 BPM | BODY MASS INDEX: 23.03 KG/M2 | OXYGEN SATURATION: 95 % | TEMPERATURE: 97.4 F | DIASTOLIC BLOOD PRESSURE: 92 MMHG | WEIGHT: 114 LBS

## 2022-07-05 DIAGNOSIS — I45.9 CONDUCTION DISORDER, UNSPECIFIED: ICD-10-CM

## 2022-07-05 PROCEDURE — 99214 OFFICE O/P EST MOD 30 MIN: CPT

## 2022-07-05 RX ORDER — LOSARTAN POTASSIUM 100 MG/1
100 TABLET, FILM COATED ORAL
Qty: 30 | Refills: 0 | Status: ACTIVE | COMMUNITY
Start: 2022-07-02

## 2022-09-21 NOTE — HISTORY OF PRESENT ILLNESS
[FreeTextEntry1] : 7/5/22- Patient recently had CT and was found to have a 2.6 cm pericardial cyst. She was recommended to have MRI. Patient reports L sided CP that is tender to touch for the last 2 months. Her BP is elevated 159/92. Patient is on Amlodipine 5 mg, Atorvastatin 10 mg, Losartan 100 mg, Metoprolol ER 25 mg.  Patient denies SOB. Patient denies palpitations. She reports dizziness with exertion likely due to vertigo. I will obtain insurance authorization for MRI. I advised patient to wear Holter monitor, but she reports that she wore it at end of last year with PCP and it was probably normal. \par \par 8/10/21 - Patient reports that 2 nights ago she had lower CP after she woke up to use bathroom and felt better after drinking water. Patient reports belching and felt better.  I advised patient to undergo a treadmill stress test. Patient underwent a treadmill stress test and completed 10 minutes of Marcelino protocol.  There were upsloping ST depressions on ECG but no symptoms.  Following treadmill stress, there was no echocardiographic evidence of ischemia. \par \par 7/28/21 - Patient had recent episode of syncope when she had stomach ache using the bathroom. Patient hit her head and was unconscious with diaphoresis. Patient reports palpitations. Patient denies CP. Patient denies SOB.  Patient had an episode of vertigo with vomiting 1 month ago. Her BP was elevated at the time and she went to ER. She had head CT which was negative. She has been having headaches since. I advised patient to undergo an echocardiogram. I advised patient to wear a Holter monitor.  Patient underwent an echocardiogram and it showed normal LV function without significant valvular pathology.  Patient wore a Holter and it showed rare APC's, rare PVC's, with episodes of 2:1 AV block at night as well as type I second degree AV block.  Her syncope was likely vasovagal in etiology but she may need a PPM if she has recurrent syncope.\par \par 5/28/15 - Patient reports that for the past week she has been experiencing upper and substernal chest discomfort, described as tightness, not related to exertion or to meals, relieved with hiccups. She denies SOB with exertion. She denies palpitations ever since ablation. She denies h/o syncope.

## 2022-09-21 NOTE — PHYSICAL EXAM
[Well Developed] : well developed [Well Nourished] : well nourished [No Acute Distress] : no acute distress [Normal Conjunctiva] : normal conjunctiva [Normal Venous Pressure] : normal venous pressure [No Carotid Bruit] : no carotid bruit [Normal S1, S2] : normal S1, S2 [No Rub] : no rub [No Gallop] : no gallop [Murmur] : murmur [Clear Lung Fields] : clear lung fields [Good Air Entry] : good air entry [No Respiratory Distress] : no respiratory distress  [Soft] : abdomen soft [Non Tender] : non-tender [No Masses/organomegaly] : no masses/organomegaly [Normal Bowel Sounds] : normal bowel sounds [Normal Gait] : normal gait [No Edema] : no edema [No Cyanosis] : no cyanosis [No Clubbing] : no clubbing [No Varicosities] : no varicosities [Moves all extremities] : moves all extremities [No Focal Deficits] : no focal deficits [Normal Speech] : normal speech [Alert and Oriented] : alert and oriented [Normal memory] : normal memory [de-identified] : 2/6 ANSHU at apex

## 2022-09-21 NOTE — REASON FOR VISIT
[FreeTextEntry1] : 67-year-old female with HTN, hyperlipidemia, h/o tachycardia s/p ablation, hiatal hernia, hyperglycemia, osteoporosis, presents for followup. \par \par Patient was last seen on  8/10/21 for lower CP after she woke up to use bathroom and felt better after drinking water.   I advised patient to undergo a treadmill stress test.  Patient underwent a treadmill stress test 8/10/21 and completed 10 minutes of Marcelino protocol.  There were upsloping ST depressions on ECG but no symptoms.  Following treadmill stress, there was no echocardiographic evidence of ischemia. \par \par Patient underwent an echocardiogram 7/30/21 and it showed normal LV function without significant valvular pathology.  \par \par Patient wore a Holter 7/28/21 and it showed rare APC's, rare PVC's, with episodes of 2:1 AV block at night as well as type I second degree AV block.  I advised patient that she may need a PPM if she has recurrent syncope.\par

## 2022-09-21 NOTE — DISCUSSION/SUMMARY
[FreeTextEntry1] : 67-year-old female with HTN, hyperlipidemia, h/o tachycardia s/p ablation, hiatal hernia, hyperglycemia, osteoporosis, presents for followup. \par \par Patient was last seen on  8/10/21 for lower CP after she woke up to use bathroom and felt better after drinking water.   I advised patient to undergo a treadmill stress test.  Patient underwent a treadmill stress test 8/10/21 and completed 10 minutes of Marcelino protocol.  There were upsloping ST depressions on ECG but no symptoms.  Following treadmill stress, there was no echocardiographic evidence of ischemia. \par \par Patient underwent an echocardiogram 7/30/21 and it showed normal LV function without significant valvular pathology.  \par \par Patient wore a Holter 7/28/21 and it showed rare APC's, rare PVC's, with episodes of 2:1 AV block at night as well as type I second degree AV block.  I advised patient that she may need a PPM if she has recurrent syncope.\par \par 7/5/22- Patient recently had CT and was found to have a 2.6 cm pericardial cyst. She was recommended to have MRI. Patient reports L sided CP that is tender to touch for the last 2 months. Her BP is elevated 159/92. Patient is on Amlodipine 5 mg, Atorvastatin 10 mg, Losartan 100 mg, Metoprolol ER 25 mg.  Patient denies SOB. Patient denies palpitations. She reports dizziness with exertion likely due to vertigo. I will obtain insurance authorization for MRI. I advised patient to wear Holter monitor, but she reports that she wore it at end of last year with PCP and it was probably normal. \par \par (1) Pericardial cyst on CT - She was recommended to have MRI chest with contrast by radiology.  I will obtain authorization.\par \par (2) CP, tender to touch - Her symptom may be musculoskeletal in etiology. \par \par (3) HTN - Her BP was elevated.  I advised patient to limit salt intake.  I advised patient to continue Amlodipine 5 mg,  Losartan 100 mg, Metoprolol ER 25 mg for HTN.  Amlodipine dose can be increased if BP remains elevated.\par \par (4) Followup - pending MRI chest. \par

## 2022-12-06 NOTE — ED PROVIDER NOTE - EKG ADDITIONAL QUESTION - PERFORMED INDEPENDENT VISUALIZATION
Review of Systems      Genitourinary none   Cardiology none   Gastrointestinal none   Neurology balance problems   Constitutional none   Integumentary none   Psychiatry none   Musculoskeletal back pain   Pulmonary none   ENT none   Endocrine none   Hematological none Yes

## 2023-04-06 NOTE — ED ADULT TRIAGE NOTE - DOMESTIC TRAVEL HIGH RISK QUESTION
or use of a cane. She states pain will wake her up sometimes at night with certain movements. She feels like pain is interfering with her daily life, work. She states she works as a  at [employer], where she has to bend, lift, twisting exacerbates her pain. She denies any numbness, tingling, loss of bowel or bladder control. Pain scale : at worst pain is 10/10, at best pain is 2/10. Today, 3/21/2023, patient presents for planned procedure follow-up. She completed right transforaminal lumbar epidural steroid injection at L4-5 with Dr. Criss Tinajero on 2/23/2023. Procedure went really well, had significant relief since that day. She states she has about 90 to 95% relief that continues today. She reports that she did complete therapy, with significant relief as well. She continues to try to do home exercises. She states with the significant benefit that she has been having from injection she has been able to tolerate walking, movement, working around the house with pain much more controlled. She does report she notices some muscle stiffness, aching, soreness in general throughout her body. She states she thinks is because she was sitting for so long and not doing any exercises and now she has become more active. She reports that she does see dr Chad Chavez on 4/14/23 to reevaluate. She also states she has been out of the tramadol, she did call for refill but never heard back from the office. She states she gets some headaches now that she knows will be worsening the morning, but takes Tylenol and Excedrin with good relief. She denies any new concerns. Today, 4/6/2023, patient presents for requested follow-up. Pain is back and seems worse than what it was prior to her procedure. She states pain is in her low back, into her buttocks and down both legs to her feet now. She states it is a ache, burning, feels like \"someone kicked me in the back\".  She states that being on her feet, at work, has made the No

## 2023-07-23 ENCOUNTER — EMERGENCY (EMERGENCY)
Facility: HOSPITAL | Age: 68
LOS: 1 days | Discharge: HOME | End: 2023-07-23
Attending: EMERGENCY MEDICINE | Admitting: EMERGENCY MEDICINE
Payer: MEDICARE

## 2023-07-23 VITALS
RESPIRATION RATE: 20 BRPM | WEIGHT: 115.08 LBS | HEIGHT: 60 IN | HEART RATE: 51 BPM | TEMPERATURE: 98 F | DIASTOLIC BLOOD PRESSURE: 67 MMHG | OXYGEN SATURATION: 97 % | SYSTOLIC BLOOD PRESSURE: 112 MMHG

## 2023-07-23 DIAGNOSIS — Z98.890 OTHER SPECIFIED POSTPROCEDURAL STATES: Chronic | ICD-10-CM

## 2023-07-23 DIAGNOSIS — O34.29 MATERNAL CARE DUE TO UTERINE SCAR FROM OTHER PREVIOUS SURGERY: Chronic | ICD-10-CM

## 2023-07-23 PROCEDURE — 99053 MED SERV 10PM-8AM 24 HR FAC: CPT

## 2023-07-23 PROCEDURE — 99285 EMERGENCY DEPT VISIT HI MDM: CPT

## 2023-07-23 RX ORDER — MORPHINE SULFATE 50 MG/1
4 CAPSULE, EXTENDED RELEASE ORAL ONCE
Refills: 0 | Status: DISCONTINUED | OUTPATIENT
Start: 2023-07-23 | End: 2023-07-23

## 2023-07-23 RX ORDER — ONDANSETRON 8 MG/1
4 TABLET, FILM COATED ORAL ONCE
Refills: 0 | Status: COMPLETED | OUTPATIENT
Start: 2023-07-23 | End: 2023-07-24

## 2023-07-23 RX ORDER — SODIUM CHLORIDE 9 MG/ML
1000 INJECTION INTRAMUSCULAR; INTRAVENOUS; SUBCUTANEOUS ONCE
Refills: 0 | Status: COMPLETED | OUTPATIENT
Start: 2023-07-23 | End: 2023-07-23

## 2023-07-23 NOTE — ED PROVIDER NOTE - CARE PROVIDER_API CALL
Shayy Melendez  Urology  [address] [place] [id number]  Phone: [phone]  Fax: [phone]  Follow Up Time: 1-3 Days

## 2023-07-23 NOTE — ED ADULT TRIAGE NOTE - CHIEF COMPLAINT QUOTE
c/o left flank to left back pain since last night. hx renal colic. c/o left groin to flank pain since last night. hx renal colic.

## 2023-07-23 NOTE — ED ADULT NURSE NOTE - OBJECTIVE STATEMENT
Ambulatory to w/ c/o left groin to flank pain since last night. hx renal colic. u/a obtained and sent to lab. Vomited x1 in ER.

## 2023-07-23 NOTE — ED PROVIDER NOTE - PROGRESS NOTE DETAILS
discussed results with pt and family, advise that stone less likely to pass on its own given the size, will need to f/u with urology, pt will also f/u with her PCP, discussed prophylactic abx, rx for pain/nausea control, just finishing ivf prior to dc, feeling much improved

## 2023-07-23 NOTE — ED ADULT NURSE NOTE - NSFALLUNIVINTERV_ED_ALL_ED
Bed/Stretcher in lowest position, wheels locked, appropriate side rails in place/Call bell, personal items and telephone in reach/Instruct patient to call for assistance before getting out of bed/chair/stretcher/Non-slip footwear applied when patient is off stretcher/[place] to call system/Physically safe environment - no spills, clutter or unnecessary equipment/Purposeful proactive rounding/Room/bathroom lighting operational, light cord in reach

## 2023-07-23 NOTE — ED PROVIDER NOTE - OBJECTIVE STATEMENT
68 y.o. F c/o left LQ pain radiating into back, pt has h/o renal stones, this could be another, last was about 6 months ago, reports some urinary frequency, but no burning, no fever, has n/v since coming to ED, yesterday felt a little pain but today was ok during the day until tonight

## 2023-07-23 NOTE — ED PROVIDER NOTE - PATIENT PORTAL LINK FT
You can access the FollowMyHealth Patient Portal offered by [facility] by registering at the following website: [email or web address]. By joining [facility]’s FollowMyHealth portal, you will also be able to view your health information using other applications (apps) compatible with our system.

## 2023-07-24 VITALS
DIASTOLIC BLOOD PRESSURE: 77 MMHG | SYSTOLIC BLOOD PRESSURE: 122 MMHG | HEART RATE: 77 BPM | OXYGEN SATURATION: 98 % | TEMPERATURE: 98 F | RESPIRATION RATE: 18 BRPM

## 2023-07-24 LAB
ALBUMIN SERPL ELPH-MCNC: 3.5 G/DL — SIGNIFICANT CHANGE UP (ref 3.3–5)
ALP SERPL-CCNC: 50 U/L — SIGNIFICANT CHANGE UP (ref 30–120)
ALT FLD-CCNC: 33 U/L DA — SIGNIFICANT CHANGE UP (ref 10–60)
ANION GAP SERPL CALC-SCNC: 9 MMOL/L — SIGNIFICANT CHANGE UP (ref 5–17)
APPEARANCE UR: CLEAR — SIGNIFICANT CHANGE UP
AST SERPL-CCNC: 27 U/L — SIGNIFICANT CHANGE UP (ref 10–40)
BASOPHILS # BLD AUTO: 0.06 K/UL — SIGNIFICANT CHANGE UP (ref 0–0.2)
BASOPHILS NFR BLD AUTO: 0.5 % — SIGNIFICANT CHANGE UP (ref 0–2)
BILIRUB SERPL-MCNC: 0.2 MG/DL — SIGNIFICANT CHANGE UP (ref 0.2–1.2)
BILIRUB UR-MCNC: NEGATIVE — SIGNIFICANT CHANGE UP
BUN SERPL-MCNC: 24 MG/DL — HIGH (ref 7–23)
CALCIUM SERPL-MCNC: 9.3 MG/DL — SIGNIFICANT CHANGE UP (ref 8.4–10.5)
CHLORIDE SERPL-SCNC: 106 MMOL/L — SIGNIFICANT CHANGE UP (ref 96–108)
CO2 SERPL-SCNC: 28 MMOL/L — SIGNIFICANT CHANGE UP (ref 22–31)
COLOR SPEC: YELLOW — SIGNIFICANT CHANGE UP
CREAT SERPL-MCNC: 0.9 MG/DL — SIGNIFICANT CHANGE UP (ref 0.5–1.3)
DIFF PNL FLD: ABNORMAL
EGFR: 71 ML/MIN/1.73M2 — SIGNIFICANT CHANGE UP
EOSINOPHIL # BLD AUTO: 0.16 K/UL — SIGNIFICANT CHANGE UP (ref 0–0.5)
EOSINOPHIL NFR BLD AUTO: 1.2 % — SIGNIFICANT CHANGE UP (ref 0–6)
GLUCOSE SERPL-MCNC: 136 MG/DL — HIGH (ref 70–99)
GLUCOSE UR QL: NEGATIVE MG/DL — SIGNIFICANT CHANGE UP
HCT VFR BLD CALC: 36.3 % — SIGNIFICANT CHANGE UP (ref 34.5–45)
HGB BLD-MCNC: 12.4 G/DL — SIGNIFICANT CHANGE UP (ref 11.5–15.5)
IMM GRANULOCYTES NFR BLD AUTO: 0.3 % — SIGNIFICANT CHANGE UP (ref 0–0.9)
KETONES UR-MCNC: NEGATIVE MG/DL — SIGNIFICANT CHANGE UP
LEUKOCYTE ESTERASE UR-ACNC: ABNORMAL
LYMPHOCYTES # BLD AUTO: 16.9 % — SIGNIFICANT CHANGE UP (ref 13–44)
LYMPHOCYTES # BLD AUTO: 2.23 K/UL — SIGNIFICANT CHANGE UP (ref 1–3.3)
MCHC RBC-ENTMCNC: 32.1 PG — SIGNIFICANT CHANGE UP (ref 27–34)
MCHC RBC-ENTMCNC: 34.2 GM/DL — SIGNIFICANT CHANGE UP (ref 32–36)
MCV RBC AUTO: 94 FL — SIGNIFICANT CHANGE UP (ref 80–100)
MONOCYTES # BLD AUTO: 1 K/UL — HIGH (ref 0–0.9)
MONOCYTES NFR BLD AUTO: 7.6 % — SIGNIFICANT CHANGE UP (ref 2–14)
NEUTROPHILS # BLD AUTO: 9.71 K/UL — HIGH (ref 1.8–7.4)
NEUTROPHILS NFR BLD AUTO: 73.5 % — SIGNIFICANT CHANGE UP (ref 43–77)
NITRITE UR-MCNC: NEGATIVE — SIGNIFICANT CHANGE UP
NRBC # BLD: 0 /100 WBCS — SIGNIFICANT CHANGE UP (ref 0–0)
PH UR: 5.5 — SIGNIFICANT CHANGE UP (ref 5–8)
PLATELET # BLD AUTO: 205 K/UL — SIGNIFICANT CHANGE UP (ref 150–400)
POTASSIUM SERPL-MCNC: 3.8 MMOL/L — SIGNIFICANT CHANGE UP (ref 3.5–5.3)
POTASSIUM SERPL-SCNC: 3.8 MMOL/L — SIGNIFICANT CHANGE UP (ref 3.5–5.3)
PROT SERPL-MCNC: 7.5 G/DL — SIGNIFICANT CHANGE UP (ref 6–8.3)
PROT UR-MCNC: NEGATIVE MG/DL — SIGNIFICANT CHANGE UP
RBC # BLD: 3.86 M/UL — SIGNIFICANT CHANGE UP (ref 3.8–5.2)
RBC # FLD: 12.7 % — SIGNIFICANT CHANGE UP (ref 10.3–14.5)
SODIUM SERPL-SCNC: 143 MMOL/L — SIGNIFICANT CHANGE UP (ref 135–145)
SP GR SPEC: 1.01 — SIGNIFICANT CHANGE UP (ref 1–1.03)
UROBILINOGEN FLD QL: 0.2 MG/DL — SIGNIFICANT CHANGE UP (ref 0.2–1)
WBC # BLD: 13.2 K/UL — HIGH (ref 3.8–10.5)
WBC # FLD AUTO: 13.2 K/UL — HIGH (ref 3.8–10.5)

## 2023-07-24 PROCEDURE — 36415 COLL VENOUS BLD VENIPUNCTURE: CPT

## 2023-07-24 PROCEDURE — 96361 HYDRATE IV INFUSION ADD-ON: CPT

## 2023-07-24 PROCEDURE — 87186 SC STD MICRODIL/AGAR DIL: CPT

## 2023-07-24 PROCEDURE — 96375 TX/PRO/DX INJ NEW DRUG ADDON: CPT

## 2023-07-24 PROCEDURE — 80053 COMPREHEN METABOLIC PANEL: CPT

## 2023-07-24 PROCEDURE — 99284 EMERGENCY DEPT VISIT MOD MDM: CPT | Mod: 25

## 2023-07-24 PROCEDURE — 87086 URINE CULTURE/COLONY COUNT: CPT

## 2023-07-24 PROCEDURE — 87077 CULTURE AEROBIC IDENTIFY: CPT

## 2023-07-24 PROCEDURE — 74176 CT ABD & PELVIS W/O CONTRAST: CPT | Mod: MA

## 2023-07-24 PROCEDURE — 96374 THER/PROPH/DIAG INJ IV PUSH: CPT

## 2023-07-24 PROCEDURE — 74176 CT ABD & PELVIS W/O CONTRAST: CPT | Mod: 26,MA

## 2023-07-24 PROCEDURE — 85025 COMPLETE CBC W/AUTO DIFF WBC: CPT

## 2023-07-24 PROCEDURE — 81001 URINALYSIS AUTO W/SCOPE: CPT

## 2023-07-24 RX ORDER — NITROFURANTOIN MACROCRYSTAL 50 MG
1 CAPSULE ORAL
Qty: 14 | Refills: 0
Start: 2023-07-24 | End: 2023-07-30

## 2023-07-24 RX ORDER — ONDANSETRON 8 MG/1
1 TABLET, FILM COATED ORAL
Qty: 1 | Refills: 0
Start: 2023-07-24

## 2023-07-24 RX ORDER — OXYCODONE AND ACETAMINOPHEN 5; 325 MG/1; MG/1
1 TABLET ORAL
Qty: 9 | Refills: 0
Start: 2023-07-24 | End: 2023-07-26

## 2023-07-24 RX ADMIN — ONDANSETRON 4 MILLIGRAM(S): 8 TABLET, FILM COATED ORAL at 00:33

## 2023-07-24 RX ADMIN — SODIUM CHLORIDE 1000 MILLILITER(S): 9 INJECTION INTRAMUSCULAR; INTRAVENOUS; SUBCUTANEOUS at 00:34

## 2023-07-24 RX ADMIN — SODIUM CHLORIDE 1000 MILLILITER(S): 9 INJECTION INTRAMUSCULAR; INTRAVENOUS; SUBCUTANEOUS at 01:34

## 2023-07-24 RX ADMIN — MORPHINE SULFATE 4 MILLIGRAM(S): 50 CAPSULE, EXTENDED RELEASE ORAL at 00:33

## 2023-07-24 RX ADMIN — MORPHINE SULFATE 4 MILLIGRAM(S): 50 CAPSULE, EXTENDED RELEASE ORAL at 00:48

## 2023-07-27 LAB
-  AMIKACIN: SIGNIFICANT CHANGE UP
-  AMOXICILLIN/CLAVULANIC ACID: SIGNIFICANT CHANGE UP
-  AMPICILLIN/SULBACTAM: SIGNIFICANT CHANGE UP
-  AMPICILLIN: SIGNIFICANT CHANGE UP
-  AZTREONAM: SIGNIFICANT CHANGE UP
-  CEFAZOLIN: SIGNIFICANT CHANGE UP
-  CEFEPIME: SIGNIFICANT CHANGE UP
-  CEFOXITIN: SIGNIFICANT CHANGE UP
-  CEFTRIAXONE: SIGNIFICANT CHANGE UP
-  CEFUROXIME: SIGNIFICANT CHANGE UP
-  CIPROFLOXACIN: SIGNIFICANT CHANGE UP
-  ERTAPENEM: SIGNIFICANT CHANGE UP
-  GENTAMICIN: SIGNIFICANT CHANGE UP
-  IMIPENEM: SIGNIFICANT CHANGE UP
-  LEVOFLOXACIN: SIGNIFICANT CHANGE UP
-  MEROPENEM: SIGNIFICANT CHANGE UP
-  NITROFURANTOIN: SIGNIFICANT CHANGE UP
-  PIPERACILLIN/TAZOBACTAM: SIGNIFICANT CHANGE UP
-  TOBRAMYCIN: SIGNIFICANT CHANGE UP
-  TRIMETHOPRIM/SULFAMETHOXAZOLE: SIGNIFICANT CHANGE UP
METHOD TYPE: SIGNIFICANT CHANGE UP

## 2023-07-29 LAB
-  AMPICILLIN: SIGNIFICANT CHANGE UP
-  CIPROFLOXACIN: SIGNIFICANT CHANGE UP
-  LEVOFLOXACIN: SIGNIFICANT CHANGE UP
-  NITROFURANTOIN: SIGNIFICANT CHANGE UP
-  TETRACYCLINE: SIGNIFICANT CHANGE UP
-  VANCOMYCIN: SIGNIFICANT CHANGE UP
CULTURE RESULTS: SIGNIFICANT CHANGE UP
METHOD TYPE: SIGNIFICANT CHANGE UP
ORGANISM # SPEC MICROSCOPIC CNT: SIGNIFICANT CHANGE UP
SPECIMEN SOURCE: SIGNIFICANT CHANGE UP

## 2023-08-01 NOTE — PATIENT PROFILE ADULT - DEAF OR HARD OF HEARING?
Vitamin D levels are near normal.  Continue present dose of any vitamin D supplements if she takes over the counter.  Based on labs ordered for end of August.  Prescription sent for 7 125 mcg daily. no
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
## 2023-08-27 ENCOUNTER — INPATIENT (INPATIENT)
Facility: HOSPITAL | Age: 68
LOS: 0 days | Discharge: LEFT BEFORE DISCHARGE | DRG: 69 | End: 2023-08-28
Attending: INTERNAL MEDICINE | Admitting: INTERNAL MEDICINE
Payer: MEDICARE

## 2023-08-27 ENCOUNTER — TRANSCRIPTION ENCOUNTER (OUTPATIENT)
Age: 68
End: 2023-08-27

## 2023-08-27 VITALS
WEIGHT: 115.3 LBS | RESPIRATION RATE: 18 BRPM | SYSTOLIC BLOOD PRESSURE: 181 MMHG | TEMPERATURE: 98 F | HEART RATE: 88 BPM | OXYGEN SATURATION: 97 % | HEIGHT: 60 IN | DIASTOLIC BLOOD PRESSURE: 88 MMHG

## 2023-08-27 DIAGNOSIS — E87.1 HYPO-OSMOLALITY AND HYPONATREMIA: ICD-10-CM

## 2023-08-27 DIAGNOSIS — G45.9 TRANSIENT CEREBRAL ISCHEMIC ATTACK, UNSPECIFIED: ICD-10-CM

## 2023-08-27 DIAGNOSIS — O34.29 MATERNAL CARE DUE TO UTERINE SCAR FROM OTHER PREVIOUS SURGERY: Chronic | ICD-10-CM

## 2023-08-27 DIAGNOSIS — R93.89 ABNORMAL FINDINGS ON DIAGNOSTIC IMAGING OF OTHER SPECIFIED BODY STRUCTURES: ICD-10-CM

## 2023-08-27 DIAGNOSIS — E78.5 HYPERLIPIDEMIA, UNSPECIFIED: ICD-10-CM

## 2023-08-27 DIAGNOSIS — Z98.890 OTHER SPECIFIED POSTPROCEDURAL STATES: Chronic | ICD-10-CM

## 2023-08-27 DIAGNOSIS — N20.0 CALCULUS OF KIDNEY: ICD-10-CM

## 2023-08-27 DIAGNOSIS — Z29.9 ENCOUNTER FOR PROPHYLACTIC MEASURES, UNSPECIFIED: ICD-10-CM

## 2023-08-27 DIAGNOSIS — I10 ESSENTIAL (PRIMARY) HYPERTENSION: ICD-10-CM

## 2023-08-27 DIAGNOSIS — Z78.9 OTHER SPECIFIED HEALTH STATUS: ICD-10-CM

## 2023-08-27 LAB
A1C WITH ESTIMATED AVERAGE GLUCOSE RESULT: 6.5 % — HIGH (ref 4–5.6)
ALBUMIN SERPL ELPH-MCNC: 3.4 G/DL — SIGNIFICANT CHANGE UP (ref 3.3–5)
ALP SERPL-CCNC: 41 U/L — SIGNIFICANT CHANGE UP (ref 30–120)
ALT FLD-CCNC: 19 U/L — SIGNIFICANT CHANGE UP (ref 10–60)
ANION GAP SERPL CALC-SCNC: 10 MMOL/L — SIGNIFICANT CHANGE UP (ref 5–17)
ANION GAP SERPL CALC-SCNC: 11 MMOL/L — SIGNIFICANT CHANGE UP (ref 5–17)
APPEARANCE UR: CLEAR — SIGNIFICANT CHANGE UP
APTT BLD: 29.9 SEC — SIGNIFICANT CHANGE UP (ref 24.5–35.6)
AST SERPL-CCNC: 29 U/L — SIGNIFICANT CHANGE UP (ref 10–40)
BACTERIA # UR AUTO: NEGATIVE /HPF — SIGNIFICANT CHANGE UP
BASOPHILS # BLD AUTO: 0.05 K/UL — SIGNIFICANT CHANGE UP (ref 0–0.2)
BASOPHILS NFR BLD AUTO: 0.7 % — SIGNIFICANT CHANGE UP (ref 0–2)
BILIRUB SERPL-MCNC: 0.2 MG/DL — SIGNIFICANT CHANGE UP (ref 0.2–1.2)
BILIRUB UR-MCNC: NEGATIVE — SIGNIFICANT CHANGE UP
BUN SERPL-MCNC: 14 MG/DL — SIGNIFICANT CHANGE UP (ref 7–23)
BUN SERPL-MCNC: 15 MG/DL — SIGNIFICANT CHANGE UP (ref 7–23)
CALCIUM SERPL-MCNC: 7.6 MG/DL — LOW (ref 8.4–10.5)
CALCIUM SERPL-MCNC: 8.9 MG/DL — SIGNIFICANT CHANGE UP (ref 8.4–10.5)
CHLORIDE SERPL-SCNC: 104 MMOL/L — SIGNIFICANT CHANGE UP (ref 96–108)
CHLORIDE SERPL-SCNC: 90 MMOL/L — LOW (ref 96–108)
CHOLEST SERPL-MCNC: 177 MG/DL — SIGNIFICANT CHANGE UP
CO2 SERPL-SCNC: 21 MMOL/L — LOW (ref 22–31)
CO2 SERPL-SCNC: 26 MMOL/L — SIGNIFICANT CHANGE UP (ref 22–31)
COLOR SPEC: YELLOW — SIGNIFICANT CHANGE UP
CREAT SERPL-MCNC: 0.61 MG/DL — SIGNIFICANT CHANGE UP (ref 0.5–1.3)
CREAT SERPL-MCNC: 0.68 MG/DL — SIGNIFICANT CHANGE UP (ref 0.5–1.3)
DIFF PNL FLD: ABNORMAL
EGFR: 96 ML/MIN/1.73M2 — SIGNIFICANT CHANGE UP
EGFR: 99 ML/MIN/1.73M2 — SIGNIFICANT CHANGE UP
EOSINOPHIL # BLD AUTO: 0.19 K/UL — SIGNIFICANT CHANGE UP (ref 0–0.5)
EOSINOPHIL NFR BLD AUTO: 2.6 % — SIGNIFICANT CHANGE UP (ref 0–6)
EPI CELLS # UR: SIGNIFICANT CHANGE UP
ESTIMATED AVERAGE GLUCOSE: 140 MG/DL — HIGH (ref 68–114)
GLUCOSE SERPL-MCNC: 114 MG/DL — HIGH (ref 70–99)
GLUCOSE SERPL-MCNC: 124 MG/DL — HIGH (ref 70–99)
GLUCOSE UR QL: NEGATIVE MG/DL — SIGNIFICANT CHANGE UP
HCT VFR BLD CALC: 36.6 % — SIGNIFICANT CHANGE UP (ref 34.5–45)
HDLC SERPL-MCNC: 60 MG/DL — SIGNIFICANT CHANGE UP
HGB BLD-MCNC: 11.9 G/DL — SIGNIFICANT CHANGE UP (ref 11.5–15.5)
IMM GRANULOCYTES NFR BLD AUTO: 1 % — HIGH (ref 0–0.9)
INR BLD: 0.94 RATIO — SIGNIFICANT CHANGE UP (ref 0.85–1.18)
KETONES UR-MCNC: NEGATIVE MG/DL — SIGNIFICANT CHANGE UP
LEUKOCYTE ESTERASE UR-ACNC: NEGATIVE — SIGNIFICANT CHANGE UP
LIPID PNL WITH DIRECT LDL SERPL: 98 MG/DL — SIGNIFICANT CHANGE UP
LYMPHOCYTES # BLD AUTO: 1.82 K/UL — SIGNIFICANT CHANGE UP (ref 1–3.3)
LYMPHOCYTES # BLD AUTO: 24.7 % — SIGNIFICANT CHANGE UP (ref 13–44)
MCHC RBC-ENTMCNC: 30.7 PG — SIGNIFICANT CHANGE UP (ref 27–34)
MCHC RBC-ENTMCNC: 32.5 GM/DL — SIGNIFICANT CHANGE UP (ref 32–36)
MCV RBC AUTO: 94.6 FL — SIGNIFICANT CHANGE UP (ref 80–100)
MONOCYTES # BLD AUTO: 0.41 K/UL — SIGNIFICANT CHANGE UP (ref 0–0.9)
MONOCYTES NFR BLD AUTO: 5.6 % — SIGNIFICANT CHANGE UP (ref 2–14)
NEUTROPHILS # BLD AUTO: 4.82 K/UL — SIGNIFICANT CHANGE UP (ref 1.8–7.4)
NEUTROPHILS NFR BLD AUTO: 65.4 % — SIGNIFICANT CHANGE UP (ref 43–77)
NITRITE UR-MCNC: NEGATIVE — SIGNIFICANT CHANGE UP
NON HDL CHOLESTEROL: 117 MG/DL — SIGNIFICANT CHANGE UP
NRBC # BLD: 0 /100 WBCS — SIGNIFICANT CHANGE UP (ref 0–0)
OSMOLALITY SERPL: 295 MOSMOL/KG — SIGNIFICANT CHANGE UP (ref 280–301)
OSMOLALITY UR: 292 MOSM/KG — SIGNIFICANT CHANGE UP (ref 50–1200)
OSMOLALITY UR: 326 MOSM/KG — SIGNIFICANT CHANGE UP (ref 50–1200)
PH UR: 7.5 — SIGNIFICANT CHANGE UP (ref 5–8)
PLATELET # BLD AUTO: 170 K/UL — SIGNIFICANT CHANGE UP (ref 150–400)
POTASSIUM SERPL-MCNC: 3.6 MMOL/L — SIGNIFICANT CHANGE UP (ref 3.5–5.3)
POTASSIUM SERPL-MCNC: 3.9 MMOL/L — SIGNIFICANT CHANGE UP (ref 3.5–5.3)
POTASSIUM SERPL-SCNC: 3.6 MMOL/L — SIGNIFICANT CHANGE UP (ref 3.5–5.3)
POTASSIUM SERPL-SCNC: 3.9 MMOL/L — SIGNIFICANT CHANGE UP (ref 3.5–5.3)
PROT SERPL-MCNC: 7.3 G/DL — SIGNIFICANT CHANGE UP (ref 6–8.3)
PROT UR-MCNC: SIGNIFICANT CHANGE UP MG/DL
PROTHROM AB SERPL-ACNC: 10.5 SEC — SIGNIFICANT CHANGE UP (ref 9.5–13)
RBC # BLD: 3.87 M/UL — SIGNIFICANT CHANGE UP (ref 3.8–5.2)
RBC # FLD: 12.2 % — SIGNIFICANT CHANGE UP (ref 10.3–14.5)
RBC CASTS # UR COMP ASSIST: 1 /HPF — SIGNIFICANT CHANGE UP (ref 0–4)
SODIUM SERPL-SCNC: 122 MMOL/L — LOW (ref 135–145)
SODIUM SERPL-SCNC: 140 MMOL/L — SIGNIFICANT CHANGE UP (ref 135–145)
SODIUM UR-SCNC: 80 MMOL/L — SIGNIFICANT CHANGE UP
SP GR SPEC: 1.01 — SIGNIFICANT CHANGE UP (ref 1–1.03)
TRIGL SERPL-MCNC: 107 MG/DL — SIGNIFICANT CHANGE UP
TROPONIN I, HIGH SENSITIVITY RESULT: 4.5 NG/L — SIGNIFICANT CHANGE UP
TSH SERPL-MCNC: 0.66 UIU/ML — SIGNIFICANT CHANGE UP (ref 0.27–4.2)
URATE SERPL-MCNC: 5 MG/DL — SIGNIFICANT CHANGE UP (ref 2.5–7)
UROBILINOGEN FLD QL: 0.2 MG/DL — SIGNIFICANT CHANGE UP (ref 0.2–1)
WBC # BLD: 7.36 K/UL — SIGNIFICANT CHANGE UP (ref 3.8–10.5)
WBC # FLD AUTO: 7.36 K/UL — SIGNIFICANT CHANGE UP (ref 3.8–10.5)
WBC UR QL: 0 /HPF — SIGNIFICANT CHANGE UP (ref 0–5)

## 2023-08-27 PROCEDURE — 93010 ELECTROCARDIOGRAM REPORT: CPT

## 2023-08-27 PROCEDURE — 93306 TTE W/DOPPLER COMPLETE: CPT | Mod: 26

## 2023-08-27 PROCEDURE — 70498 CT ANGIOGRAPHY NECK: CPT | Mod: 26,MA

## 2023-08-27 PROCEDURE — 99285 EMERGENCY DEPT VISIT HI MDM: CPT

## 2023-08-27 PROCEDURE — 70496 CT ANGIOGRAPHY HEAD: CPT | Mod: 26,MA

## 2023-08-27 PROCEDURE — 70450 CT HEAD/BRAIN W/O DYE: CPT | Mod: 26,MA,XU,59

## 2023-08-27 PROCEDURE — 99223 1ST HOSP IP/OBS HIGH 75: CPT

## 2023-08-27 RX ORDER — SENNA PLUS 8.6 MG/1
2 TABLET ORAL AT BEDTIME
Refills: 0 | Status: DISCONTINUED | OUTPATIENT
Start: 2023-08-27 | End: 2023-08-28

## 2023-08-27 RX ORDER — OXYCODONE AND ACETAMINOPHEN 5; 325 MG/1; MG/1
1 TABLET ORAL EVERY 6 HOURS
Refills: 0 | Status: DISCONTINUED | OUTPATIENT
Start: 2023-08-27 | End: 2023-08-28

## 2023-08-27 RX ORDER — ASPIRIN/CALCIUM CARB/MAGNESIUM 324 MG
81 TABLET ORAL DAILY
Refills: 0 | Status: DISCONTINUED | OUTPATIENT
Start: 2023-08-27 | End: 2023-08-28

## 2023-08-27 RX ORDER — POLYETHYLENE GLYCOL 3350 17 G/17G
17 POWDER, FOR SOLUTION ORAL DAILY
Refills: 0 | Status: DISCONTINUED | OUTPATIENT
Start: 2023-08-27 | End: 2023-08-28

## 2023-08-27 RX ORDER — TAMSULOSIN HYDROCHLORIDE 0.4 MG/1
0.4 CAPSULE ORAL AT BEDTIME
Refills: 0 | Status: DISCONTINUED | OUTPATIENT
Start: 2023-08-27 | End: 2023-08-28

## 2023-08-27 RX ORDER — LOSARTAN POTASSIUM 100 MG/1
100 TABLET, FILM COATED ORAL DAILY
Refills: 0 | Status: DISCONTINUED | OUTPATIENT
Start: 2023-08-27 | End: 2023-08-28

## 2023-08-27 RX ORDER — SODIUM CHLORIDE 9 MG/ML
1000 INJECTION INTRAMUSCULAR; INTRAVENOUS; SUBCUTANEOUS ONCE
Refills: 0 | Status: COMPLETED | OUTPATIENT
Start: 2023-08-27 | End: 2023-08-27

## 2023-08-27 RX ORDER — METOPROLOL TARTRATE 50 MG
25 TABLET ORAL DAILY
Refills: 0 | Status: DISCONTINUED | OUTPATIENT
Start: 2023-08-27 | End: 2023-08-28

## 2023-08-27 RX ORDER — CHOLECALCIFEROL (VITAMIN D3) 125 MCG
1000 CAPSULE ORAL DAILY
Refills: 0 | Status: DISCONTINUED | OUTPATIENT
Start: 2023-08-27 | End: 2023-08-28

## 2023-08-27 RX ORDER — ENOXAPARIN SODIUM 100 MG/ML
40 INJECTION SUBCUTANEOUS EVERY 24 HOURS
Refills: 0 | Status: DISCONTINUED | OUTPATIENT
Start: 2023-08-27 | End: 2023-08-28

## 2023-08-27 RX ORDER — PANTOPRAZOLE SODIUM 20 MG/1
40 TABLET, DELAYED RELEASE ORAL
Refills: 0 | Status: DISCONTINUED | OUTPATIENT
Start: 2023-08-27 | End: 2023-08-28

## 2023-08-27 RX ORDER — ATORVASTATIN CALCIUM 80 MG/1
10 TABLET, FILM COATED ORAL AT BEDTIME
Refills: 0 | Status: DISCONTINUED | OUTPATIENT
Start: 2023-08-27 | End: 2023-08-28

## 2023-08-27 RX ADMIN — SODIUM CHLORIDE 1000 MILLILITER(S): 9 INJECTION INTRAMUSCULAR; INTRAVENOUS; SUBCUTANEOUS at 05:15

## 2023-08-27 RX ADMIN — TAMSULOSIN HYDROCHLORIDE 0.4 MILLIGRAM(S): 0.4 CAPSULE ORAL at 21:50

## 2023-08-27 RX ADMIN — Medication 1000 UNIT(S): at 13:00

## 2023-08-27 RX ADMIN — Medication 25 MILLIGRAM(S): at 08:14

## 2023-08-27 RX ADMIN — Medication 81 MILLIGRAM(S): at 13:00

## 2023-08-27 RX ADMIN — PANTOPRAZOLE SODIUM 40 MILLIGRAM(S): 20 TABLET, DELAYED RELEASE ORAL at 08:14

## 2023-08-27 RX ADMIN — SENNA PLUS 2 TABLET(S): 8.6 TABLET ORAL at 21:49

## 2023-08-27 RX ADMIN — LOSARTAN POTASSIUM 100 MILLIGRAM(S): 100 TABLET, FILM COATED ORAL at 08:13

## 2023-08-27 RX ADMIN — ATORVASTATIN CALCIUM 10 MILLIGRAM(S): 80 TABLET, FILM COATED ORAL at 21:49

## 2023-08-27 RX ADMIN — ENOXAPARIN SODIUM 40 MILLIGRAM(S): 100 INJECTION SUBCUTANEOUS at 12:59

## 2023-08-27 NOTE — DISCHARGE NOTE NURSING/CASE MANAGEMENT/SOCIAL WORK - NSDCPEFALRISK_GEN_ALL_CORE
For information on Fall & Injury Prevention, visit: [email or web address] OR  [email or web address] OR  https://www.cdc.gov/steadi/patient.html

## 2023-08-27 NOTE — H&P ADULT - NSHPPHYSICALEXAM_GEN_ALL_CORE
· CONSTITUTIONAL: Mandarin speaking, elder, NAD  · ENMT: Airway patent, Nasal mucosa clear. Mouth with normal mucosa. Throat has no vesicles, no oropharyngeal exudates and uvula is midline.  · EYES: Clear bilaterally, pupils equal, round and reactive to light.  · CARDIAC: mildly rapid rate, regular rhythm.  Heart sounds S1, S2.  No murmurs, rubs or gallops.  · RESPIRATORY: Breath sounds clear and equal bilaterally.  · GASTROINTESTINAL: Abdomen soft, non-tender, no guarding.  · MUSCULOSKELETAL: Spine appears normal, range of motion is not limited, no muscle or joint tenderness  · NEUROLOGICAL: Alert and oriented, no focal deficits, no motor or sensory deficits.  · SKIN: Skin normal color for race, warm, dry and intact. No evidence of rash.

## 2023-08-27 NOTE — DISCHARGE NOTE NURSING/CASE MANAGEMENT/SOCIAL WORK - PATIENT PORTAL LINK FT
You can access the FollowMyHealth Patient Portal offered by [facility] by registering at the following website: [email or web address]. By joining [facility]’s FollowMyHealth portal, you will also be able to view your health information using other applications (apps) compatible with our system.

## 2023-08-27 NOTE — PHYSICAL THERAPY INITIAL EVALUATION ADULT - ADDITIONAL COMMENTS
pt plans to stay with son and family in [place] after discharge. Pvt home 0 BILL and then plans to stay on main floor. pt does not own and DME.

## 2023-08-27 NOTE — CHART NOTE - NSCHARTNOTEFT_GEN_A_CORE
CHIEF COMPLAINT: kidney stone, hyponatremia     Interval Events: No acute event overnight. Patient is seen and examined at the bedside this morning with her son at the bedside.  Minimum pain at this time. BP now improved to -140. Patient told me she has been having the pain from kidney stone for the past 3 months. She is drinking at least 8-9 big cups of water daily to help with the stone. Denies having fever or chills. Denies dysuria.      REVIEW OF SYSTEMS:  Constitutional: No fever, or chills  HEENT: No dry eyes or eye irritation   CV: No chest pain, or palpitations   Resp: No cough, or sputum production   GI: No nausea or vomiting    Skin: No rash    : no dysuria     OBJECTIVE:  Vital Signs Last 24 Hrs  T(C): 36.8 (27 Aug 2023 06:09), Max: 36.8 (27 Aug 2023 06:09)  T(F): 98.2 (27 Aug 2023 06:09), Max: 98.2 (27 Aug 2023 06:09)  HR: 82 (27 Aug 2023 06:09) (79 - 88)  BP: 136/80 (27 Aug 2023 06:09) (136/80 - 181/88)  BP(mean): --  RR: 18 (27 Aug 2023 06:09) (18 - 18)  SpO2: 98% (27 Aug 2023 06:09) (97% - 98%)    Parameters below as of 27 Aug 2023 04:00  Patient On (Oxygen Delivery Method): room air        CAPILLARY BLOOD GLUCOSE      POCT Blood Glucose.: 110 mg/dL (27 Aug 2023 01:42)      PHYSICAL EXAM:  General: Alert and cooperative. Not in acute stress.    Head: Normocephalic   Eyes: EOMI, no ptosis.    Neck:   no masses   Respiratory:   non labored breathing   Cardiovascular:   There is no peripheral edema   Abdomen: Soft, non-tender, nondistended   Musculoskeletal: Adequately aligned spine   Extremities:  . No peripheral edema   Skin: Intact, no rash .  Neurological: AOAx4. No focal deficits  Psychiatry: The mental examination revealed the patient was oriented to person, place, and time     LINES:    HOSPITAL MEDICATIONS:  aspirin  chewable 81 milliGRAM(s) Oral daily  enoxaparin Injectable 40 milliGRAM(s) SubCutaneous every 24 hours      losartan 100 milliGRAM(s) Oral daily  metoprolol succinate ER 25 milliGRAM(s) Oral daily    atorvastatin 10 milliGRAM(s) Oral at bedtime      oxycodone    5 mG/acetaminophen 325 mG 1 Tablet(s) Oral every 6 hours PRN    pantoprazole    Tablet 40 milliGRAM(s) Oral before breakfast  polyethylene glycol 3350 17 Gram(s) Oral daily PRN  senna 2 Tablet(s) Oral at bedtime        cholecalciferol 1000 Unit(s) Oral daily            LABS:                        11.9   7.36  )-----------( 170      ( 27 Aug 2023 03:13 )             36.6     Hgb Trend: 11.9<--  08-27    140  |  104  |  14  ----------------------------<  114<H>  3.9   |  26  |  0.61    Ca    8.9      27 Aug 2023 07:19    TPro  7.3  /  Alb  3.4  /  TBili  0.2  /  DBili  x   /  AST  29  /  ALT  19  /  AlkPhos  41  08-27    Creatinine Trend: 0.61<--, 0.68<--  PT/INR - ( 27 Aug 2023 03:13 )   PT: 10.5 sec;   INR: 0.94 ratio         PTT - ( 27 Aug 2023 03:13 )  PTT:29.9 sec  Urinalysis Basic - ( 27 Aug 2023 07:19 )    Color: x / Appearance: x / SG: x / pH: x  Gluc: 114 mg/dL / Ketone: x  / Bili: x / Urobili: x   Blood: x / Protein: x / Nitrite: x   Leuk Esterase: x / RBC: x / WBC x   Sq Epi: x / Non Sq Epi: x / Bacteria: x            MICROBIOLOGY:     RADIOLOGY: FINDINGS:  Conventional arch anatomy. Great vessel origins are patent. Innominate   and visualized subclavian arteries are patent. The common carotid   arteries are unremarkable.    The carotid bifurcations are patent without significant stenosis. The   internal carotid arteries are patent without significant stenosis.    There is a 3 mm aneurysm oriented posterolateral from the right ICA   terminus (series 5, image 493).    The anterior and middle cerebral arteries are patent.    Bilateral vertebral arteries are unremarkable from their origins to their   intracranial segments.    The basilar artery is unremarkable. The posterior cerebral arteries are   patent.    Other:  No enlarged cervical adenopathy by size criteria.    The visualized lung apices are clear.    No acute osseous abnormality.    IMPRESSION:  -3 mm right ICA terminus aneurysm.  -Patent cervical and intracranial major arterial vasculature without   evidence of occlusion, hemodynamically significant stenosis, or   dissection.    --- End of Report ---      68 F with pmh HTN. HLD, recurrent nephrolithiasis for over 20 years, brought in by son for elevated blood pressure. and L sided weakness admitted for suspected TIA and hyponatremia      # TIA (transient ischemic attack).   -acute sec to pain and elevated BP, suspected  - check a1c, lipid panel-if elevated consider adjusting statin to mod intensity  - PT eval  -check echo  -  neuro consult placed  -neuro checks  -bp control  -will start 81mg asa.    # Hyponatremia.    -acute, likely secondary to polydipsia secondary to kidney stone  - Sodium now overcorrected from 122-140   - Will need to monitor sodium Q6h  - Nephrology recs appreciated  - Discussed with the patient to limit hypotonic fluid intake at this time     # HTN (hypertension).   - acute on chronic, sec to pain  pain control  cont home meds with hold parameters  dash diet.    # Nephrolithiasis.   - acute on chronic, recurrent  - UA negative, no fever or chills   - Since stone is between 5-10 mm, will start on Tamsulosin 0.4 mg daily   - Discussed with the patient and her son that if unable to pass stone and having persistent pain will need urology evaluation     # HLD (hyperlipidemia).   - chronic, stable  cont home dose  f/u lipid panel, adjust if mod intensity indicated.    # R ICA 3mm aneurysm noted on CTA neck  also CT renal hunt demonstrated incidentally Coronary  dilatation. 3.0 x 2.3 cm right cardiophrenic pericardial cyst, mildly  increased in size since prior exam. Small hiatal hernia.  son reinforced outpatient follow up with cardio and recommended to download follow [employer] for reports  - Cardiology consulted by the overnight team          #  Need for prophylactic measure.   ·-  lovenox 40mg SQ daily.

## 2023-08-27 NOTE — ED ADULT NURSE NOTE - OBJECTIVE STATEMENT
pt BIB son c/o left arm and left leg weakness since 3 pm , pt AAOx4, Mandarin speaking, son interpreting now, pt ART, speech clear.

## 2023-08-27 NOTE — H&P ADULT - PROBLEM SELECTOR PLAN 6
R ICA 3mm aneurysm noted on CTA neck  also CT renal hunt demonstrated incidentally Coronary  dilatation. 3.0 x 2.3 cm right cardiophrenic pericardial cyst, mildly  increased in size since prior exam. Small hiatal hernia.  son reinforced outpatient follow up with cardio and recommended to download follow [employer] for reports R ICA 3mm aneurysm noted on CTA neck  also CT renal hunt demonstrated incidentally Coronary  dilatation. 3.0 x 2.3 cm right cardiophrenic pericardial cyst, mildly  increased in size since prior exam. Small hiatal hernia.  son reinforced outpatient follow up with cardio and recommended to download follow [employer] for reports  will consider cardio consult dr mcfarland

## 2023-08-27 NOTE — H&P ADULT - HISTORY OF PRESENT ILLNESS
67yo mandarin speaking F with pmh HTN. HLD, recurrent nephrolithiasis for over 20 years,brought in by son for elevated blood pressure.  Patient has been diagnosed with a kidney stone 1 month ago and has been having pain from the kidney stone the son took her blood pressure tonight and found it to be elevated.  Patient also states that she has decreased power in her right left arm and left leg.  Son states that the symptoms started in the left and approximately 10 hours ago.  Symptoms have spread to involve the entire left arm and now the left leg patient describes no power but the son also states that she is describing a numbness.  Patient took oxycodone for the pain from the kidney stone.  No dizziness, no visual symptoms. At time of admission pt's symptoms were mostly resolved.     In the ED pt underwent multiple CT scans which were remarkable for R ICA 3mm aneurysm (discussed with son and recommended outpt follow up with cardio), L 7mm mid ureteral stone with mild hydronephrosis, non obstructing renal calculi.  CT head was unremarkable.

## 2023-08-27 NOTE — ED ADULT NURSE NOTE - NSFALLHARMRISKINTERV_ED_ALL_ED

## 2023-08-27 NOTE — H&P ADULT - PROBLEM SELECTOR PLAN 1
-acute sec to pain and elevated BP, suspected  -check a1c, lipid panel-if elevated consider adjusting statin to mod intensity  -PT eval  -check echo  -consider neuro consult   -neuro checks  -bp control  -will start 81mg asa

## 2023-08-27 NOTE — H&P ADULT - PROBLEM SELECTOR PLAN 2
-acute, likely dilutional  -?diuretic prescribed by PMD son to follow up which med rx, day team please follow up sons number above  -pt and son endorse drinking soley water in large amounts due to kidney stone, advised increasing fluid content in general not just wanted  -check urine na, urine osm, serum osm  -apprec nephro recs for proper correction  -monitor i/os  -bmp 1030am today, assure not overcorrection
English

## 2023-08-27 NOTE — CONSULT NOTE ADULT - ASSESSMENT
67y/o non-English speaking. Seen at [facility] telemetry. Lying flat, comfortably  History mostly from chart and   History HTN, high cholesterol, kidney stone, GERD, hiatal hernia, OP  History of uterine myomectomy    Admitted for was having abdominal pain from kidney stone  Son found her blood pressure to be high  She took an oxycodone for the pain  According to chart, she also complained of left-sided weakness  No recent, significant cardiac complaints  Troponin-4.5    Impression  Hypertension probably exacerbated by underlying pain from kidney stone    Plan:  - Continue Losartan-100mg OD                  Metoprolol succinate-25mg OD                  Atorvastatin-10mg HS  - If patient becomes hypertensive, then would add Amlodipine  - Abdominal CAT with 7mm left mid uteral stone  - Echocardiogram ordered  - Pain control  - Urology, Neurology consults

## 2023-08-27 NOTE — ED PROVIDER NOTE - OBJECTIVE STATEMENT
Patient brought in by son for elevated blood pressure.  Patient has been diagnosed with a kidney stone and has been having pain from the kidney stone the son took her blood pressure tonight and found it to be elevated.  Patient also states that she has decreased power in her right left arm and left leg.  Son states that the symptoms started in the left and approximately 10 hours ago.  Symptoms have spread to involve the entire left arm and now the left leg patient describes no power but the son also states that she is describing a numbness.  Patient took oxycodone for the pain from the kidney stone.  No dizziness, no visual symptoms.

## 2023-08-27 NOTE — ED PROVIDER NOTE - CLINICAL SUMMARY MEDICAL DECISION MAKING FREE TEXT BOX
Patient with complaint of elevated blood pressure and flank pain from recently diagnosed kidney stone.  Also states that she has decreased power in her left arm and leg.  No focal findings on formal neurologic exam.  We will get CAT scans and labs and reassess.

## 2023-08-27 NOTE — H&P ADULT - NSHPLANGTRANSLATORFT_GEN_A_CORE
Chann ID#[id number]; Son Sumit who has moderate english speaking ability but limited on medical translation

## 2023-08-27 NOTE — H&P ADULT - ASSESSMENT
67yo mandarin speaking F with pmh HTN. HLD, recurrent nephrolithiasis for over 20 years,brought in by son for elevated blood pressure. and L sided weakness admitted for suspected TIA and hyponatremia

## 2023-08-27 NOTE — DISCHARGE NOTE NURSING/CASE MANAGEMENT/SOCIAL WORK - NSPROEXTENSIONSOFSELF_GEN_A_NUR
none
[FreeTextEntry1] : 1)Cardiac: s/p cardiac cath: normal cors with LVEF 60%. \par -cont risk factor modifications\par -encourage exercise as tolerated\par 2)Mild MR:\par -cont to monitor\par 3)HTN: BP at goal\par \par No need for further cardiac testing this year. Patient can follow up in 1 year.

## 2023-08-27 NOTE — CONSULT NOTE ADULT - SUBJECTIVE AND OBJECTIVE BOX
CARDIOLOGY CONSULT NOTE    Patient is a 68y Female with a known history of :  TIA (transient ischemic attack) [G45.9]    Hyponatremia [E87.1]    HTN (hypertension) [I10]    Nephrolithiasis [N20.0]    HLD (hyperlipidemia) [E78.5]    Patient speaks only a foreign language [Z78.9]    Need for prophylactic measure [Z29.9]    Abnormal CT scan [R93.89]      HPI:  69yo mandarin speaking F with pmh HTN. HLD, recurrent nephrolithiasis for over 20 years,brought in by son for elevated blood pressure.  Patient has been diagnosed with a kidney stone 1 month ago and has been having pain from the kidney stone the son took her blood pressure tonight and found it to be elevated.  Patient also states that she has decreased power in her right left arm and left leg.  Son states that the symptoms started in the left and approximately 10 hours ago.  Symptoms have spread to involve the entire left arm and now the left leg patient describes no power but the son also states that she is describing a numbness.  Patient took oxycodone for the pain from the kidney stone.  No dizziness, no visual symptoms. At time of admission pt's symptoms were mostly resolved.     In the ED pt underwent multiple CT scans which were remarkable for R ICA 3mm aneurysm (discussed with son and recommended outpt follow up with cardio), L 7mm mid ureteral stone with mild hydronephrosis, non obstructing renal calculi.  CT head was unremarkable.    (27 Aug 2023 05:17)      REVIEW OF SYSTEMS:    CONSTITUTIONAL: No fever, weight loss, or fatigue  EYES: No eye pain, visual disturbances, or discharge  ENMT:  No difficulty hearing, tinnitus, vertigo; No sinus or throat pain  NECK: No pain or stiffness  BREASTS: No pain, masses, or nipple discharge  RESPIRATORY: No cough, wheezing, chills or hemoptysis; No shortness of breath  CARDIOVASCULAR: No chest pain, palpitations, dizziness, or leg swelling  GASTROINTESTINAL: No abdominal or epigastric pain. No nausea, vomiting, or hematemesis; No diarrhea or constipation. No melena or hematochezia.  GENITOURINARY: No dysuria, frequency, hematuria, or incontinence  NEUROLOGICAL: No headaches, memory loss, loss of strength, numbness, or tremors  SKIN: No itching, burning, rashes, or lesions   LYMPH NODES: No enlarged glands  ENDOCRINE: No heat or cold intolerance; No hair loss  MUSCULOSKELETAL: No joint pain or swelling; No muscle, back, or extremity pain  PSYCHIATRIC: No depression, anxiety, mood swings, or difficulty sleeping  HEME/LYMPH: No easy bruising, or bleeding gums  ALLERGY AND IMMUNOLOGIC: No hives or eczema    MEDICATIONS  (STANDING):  aspirin  chewable 81 milliGRAM(s) Oral daily  atorvastatin 10 milliGRAM(s) Oral at bedtime  cholecalciferol 1000 Unit(s) Oral daily  enoxaparin Injectable 40 milliGRAM(s) SubCutaneous every 24 hours  losartan 100 milliGRAM(s) Oral daily  metoprolol succinate ER 25 milliGRAM(s) Oral daily  pantoprazole    Tablet 40 milliGRAM(s) Oral before breakfast  senna 2 Tablet(s) Oral at bedtime  tamsulosin 0.4 milliGRAM(s) Oral at bedtime    MEDICATIONS  (PRN):  oxycodone    5 mG/acetaminophen 325 mG 1 Tablet(s) Oral every 6 hours PRN for severe pain  polyethylene glycol 3350 17 Gram(s) Oral daily PRN Constipation      ALLERGIES: penicillin (Rash (Moderate))      FAMILY HISTORY:      Social History:  Alochol:   Smoking:   Drug Use:   Marital Status:     I&O's Detail      PHYSICAL EXAMINATION:  -----------------------------  T(C): 36.8 (08-27-23 @ 06:09), Max: 36.8 (08-27-23 @ 06:09)  HR: 82 (08-27-23 @ 06:09) (79 - 88)  BP: 136/80 (08-27-23 @ 06:09) (136/80 - 181/88)  RR: 18 (08-27-23 @ 06:09) (18 - 18)  SpO2: 98% (08-27-23 @ 06:09) (97% - 98%)  Wt(kg): --    Height (cm): 152.4 (08-27 @ 01:37)  Weight (kg): 52.3 (08-27 @ 01:37)  BMI (kg/m2): 22.5 (08-27 @ 01:37)  BSA (m2): 1.48 (08-27 @ 01:37)    Constitutional: well developed, normal appearance, well groomed, well nourished, no deformities and no acute distress.   Eyes: the conjunctiva exhibited no abnormalities and the eyelids demonstrated no xanthelasmas.   HEENT: normal oral mucosa, no oral pallor and no oral cyanosis.   Neck: normal jugular venous A waves present, normal jugular venous V waves present and no jugular venous yadav A waves.   Pulmonary: no respiratory distress, normal respiratory rhythm and effort, no accessory muscle use and lungs were clear to auscultation bilaterally. Anteriorly  Cardiovascular: heart rate and rhythm were normal, normal S1 and S2 and no murmur, gallop, rub, heave or thrill are present.   Musculoskeletal: the gait could not be assessed.   Extremities: no clubbing of the fingernails, no localized cyanosis, no petechial hemorrhages and no ischemic changes.   Skin: normal skin color and pigmentation, no rash, no venous stasis, no skin lesions, no skin ulcer and no xanthoma was observed.   Psychiatric: oriented to person, place, and time, the affect was normal, the mood was normal and not feeling anxious.     LABS:   --------  08-27    140  |  104  |  14  ----------------------------<  114<H>  3.9   |  26  |  0.61    Ca    8.9      27 Aug 2023 07:19    TPro  7.3  /  Alb  3.4  /  TBili  0.2  /  DBili  x   /  AST  29  /  ALT  19  /  AlkPhos  41  08-27                         11.9   7.36  )-----------( 170      ( 27 Aug 2023 03:13 )             36.6     PT/INR - ( 27 Aug 2023 03:13 )   PT: 10.5 sec;   INR: 0.94 ratio         PTT - ( 27 Aug 2023 03:13 )  PTT:29.9 sec              RADIOLOGY:  -----------------        ECG: NSR, first degree A-V block, no acute changes

## 2023-08-27 NOTE — PATIENT PROFILE ADULT - FALL HARM RISK - UNIVERSAL INTERVENTIONS
Bed in lowest position, wheels locked, appropriate side rails in place/Call bell, personal items and telephone in reach/Instruct patient to call for assistance before getting out of bed or chair/Non-slip footwear when patient is out of bed/[place] to call system/Physically safe environment - no spills, clutter or unnecessary equipment/Purposeful Proactive Rounding/Room/bathroom lighting operational, light cord in reach

## 2023-08-27 NOTE — ED ADULT NURSE NOTE - BEFAST BALANCE
Keila Greer (:  1939) is a 82 y.o. female,Established patient, here for evaluation of the following chief complaint(s):  Medicare AWV      Subjective   SUBJECTIVE/OBJECTIVE:  HPI:  1)Hypertension:  Patient is here for follow up chronic hypertension. This is  generally controlled on current medication regimen. BP Readings from Last 1 Encounters:   11/10/22 138/84        Takes meds as directed and tolerates them well. Most recent labs reviewed with patient and are not remarkable. No symptoms from htn standpoint per ROS. Patient is  compliant with lifestyle modifications. Patient does not smoke. Comorbid conditions include obesity  2) Hyperlipidemia:  Patient is here to follow up regarding chronic hyperlipidemia. This is  generally controlled. Treatment includes NONE AS PT HAS MYALGIA FROM STATINS. Patient is  compliant with lifestyle modifications. Patient is not a smoker. Most recent labs reviewed with patient today and are not remarkable. Comorbid conditions include obesity. 3)asthma: well controlled on prednisone ,no acute attacks  4)hypothyroidism:on levothyroxine      Review of Systems   Constitutional:  Negative for activity change, appetite change, fever and unexpected weight change. HENT:  Negative for congestion, ear pain, hearing loss, sinus pain, sore throat, tinnitus and trouble swallowing. Eyes:  Negative for photophobia, discharge, itching and visual disturbance. Respiratory:  Negative for apnea, cough, shortness of breath and wheezing. Cardiovascular:  Negative for chest pain, palpitations and leg swelling. Gastrointestinal:  Negative for abdominal distention, abdominal pain, blood in stool, constipation, diarrhea, nausea and vomiting. Endocrine: Negative for cold intolerance, polydipsia and polyuria. Genitourinary:  Negative for difficulty urinating, dysuria, frequency and pelvic pain.    Musculoskeletal:  Negative for arthralgias, back pain, joint swelling, myalgias, neck pain and neck stiffness. Skin:  Negative for rash and wound. Neurological:  Negative for dizziness, tremors, syncope, light-headedness and headaches.    CMP:    Lab Results   Component Value Date/Time     11/03/2022 11:48 AM    K 4.7 11/03/2022 11:48 AM    CL 95 11/03/2022 11:48 AM    CO2 28 11/03/2022 11:48 AM    BUN 19 11/03/2022 11:48 AM    CREATININE 1.0 11/03/2022 11:48 AM    GFRAA >60 08/01/2022 10:58 AM    LABGLOM 56 11/03/2022 11:48 AM    GLUCOSE 97 11/03/2022 11:48 AM    PROT 6.5 11/03/2022 11:48 AM    LABALBU 4.2 11/03/2022 11:48 AM    CALCIUM 10.2 11/03/2022 11:48 AM    BILITOT 0.5 11/03/2022 11:48 AM    ALKPHOS 67 11/03/2022 11:48 AM    AST 23 11/03/2022 11:48 AM    ALT 21 11/03/2022 11:48 AM     Lipid:   Lab Results   Component Value Date    CHOL 202 (H) 11/03/2022    CHOL 183 08/01/2022     Lab Results   Component Value Date    TRIG 142 11/03/2022    TRIG 217 (H) 08/01/2022     Lab Results   Component Value Date    HDL 41 11/03/2022    HDL 39 08/01/2022     Lab Results   Component Value Date    LDLCALC 133 (H) 11/03/2022    LDLCALC 101 (H) 08/01/2022     Lab Results   Component Value Date    LABVLDL 28 11/03/2022    LABVLDL 43 08/01/2022     No results found for: CHOLHDLRATIO         Objective   /84   Pulse 86   Temp 97.5 °F (36.4 °C) (Temporal)   Ht 5' 5\" (1.651 m)   Wt 197 lb (89.4 kg)   SpO2 98%   BMI 32.78 kg/m²   Current Outpatient Medications   Medication Sig Dispense Refill    furosemide (LASIX) 20 MG tablet Take 1 tablet by mouth daily 90 tablet 0    hydroCHLOROthiazide (HYDRODIURIL) 25 MG tablet TAKE 1 TABLET BY MOUTH EVERY DAY IN THE MORNING 90 tablet 0    levothyroxine (SYNTHROID) 88 MCG tablet Take 1 tablet by mouth Daily 90 tablet 0    losartan (COZAAR) 50 MG tablet Take 1 tablet by mouth daily 90 tablet 0    montelukast (SINGULAIR) 10 MG tablet Take 1 tablet by mouth nightly 90 tablet 0    omeprazole (PRILOSEC) 40 MG delayed release capsule Take 1 capsule by mouth daily 90 capsule 0    potassium chloride (KLOR-CON M) 20 MEQ TBCR extended release tablet Take 1 tablet by mouth daily (with breakfast) 90 tablet 0    predniSONE (DELTASONE) 10 MG tablet Take 1 tablet by mouth daily 90 tablet 0    albuterol (ACCUNEB) 0.63 MG/3ML nebulizer solution Take 3 mLs by nebulization every 6 hours as needed for Wheezing 270 mL 0    albuterol sulfate HFA (PROVENTIL;VENTOLIN;PROAIR) 108 (90 Base) MCG/ACT inhaler Inhale 2 puffs into the lungs every 6 hours as needed for Wheezing 18 g 0    loratadine (CLARITIN) 10 MG tablet Take 10 mg by mouth daily      vitamin B-12 (CYANOCOBALAMIN) 1000 MCG tablet Take 1,000 mcg by mouth daily      vitamin D (CHOLECALCIFEROL) 125 MCG (5000 UT) CAPS capsule Take 5,000 Units by mouth in the morning. aspirin EC 81 MG EC tablet Take 81 mg by mouth daily      KLOR-CON M20 20 MEQ extended release tablet TAKE 1 TABLET BY MOUTH EVERY DAY       No current facility-administered medications for this visit. Allergies   Allergen Reactions    Sulfa Antibiotics         Past Medical History:   Diagnosis Date    Asthma     Bell's palsy     prior to doing pt. stress test, she indicated that she experinces \"palsy like\" symptoms when she gets excited, nervous, sad, and at other times of stress etc. She stated she gets a facial drooping or stroke like facial grimace, left sided swelling and right am shaking. she stated its adrenaline related. it even occurs when happy.  all of the symptoms she described ocurred during her stress test.     Breast cancer ([facility] [address]     Depression     Diabetes mellitus ([facility] [address]     History of therapeutic radiation     Hyperlipidemia     Hypertension     Hypothyroidism     Pure hypercholesterolemia      Past Surgical History:   Procedure Laterality Date    APPENDECTOMY      BREAST LUMPECTOMY      CARDIAC CATHETERIZATION  01/01/2007    CATARACT REMOVAL Bilateral 01/01/2021    CHOLECYSTECTOMY      COLON SURGERY  01/01/2018 HYSTERECTOMY (CERVIX STATUS UNKNOWN)       Family History   Problem Relation Age of Onset    High Blood Pressure Mother     Liver Disease Father       Social History     Socioeconomic History    Marital status:      Spouse name: Not on file    Number of children: Not on file    Years of education: Not on file    Highest education level: Not on file   Occupational History    Not on file   Tobacco Use    Smoking status: Never    Smokeless tobacco: Never   Vaping Use    Vaping Use: Never used   Substance and Sexual Activity    Alcohol use: Not Currently    Drug use: Never    Sexual activity: Not Currently     Partners: Male   Other Topics Concern    Not on file   Social History Narrative    Not on file     Social Determinants of Health     Financial Resource Strain: Low Risk     Difficulty of Paying Living Expenses: Not hard at all   Food Insecurity: No Food Insecurity    Worried About Running Out of Food in the Last Year: Never true    [address] [place] [address] Place of Food in the Last Year: Never true   Transportation Needs: Not on file   Physical Activity: Inactive    Days of Exercise per Week: 0 days    Minutes of Exercise per Session: 10 min   Stress: Not on file   Social Connections: Not on file   Intimate Partner Violence: Not on file   Housing Stability: Not on file      Health Maintenance Due   Topic Date Due    DTaP/Tdap/Td vaccine (1 - Tdap) Never done    Shingles vaccine (1 of 2) Never done    DEXA (modify frequency per FRAX score)  Never done        Physical Exam  Constitutional:       Appearance: Normal appearance. She is normal weight. HENT:      Head: Normocephalic and atraumatic. Right Ear: Tympanic membrane and ear canal normal.      Left Ear: Tympanic membrane and ear canal normal.      Nose: Nose normal.      Mouth/Throat:      Mouth: Mucous membranes are moist.      Pharynx: Oropharynx is clear. Eyes:      Extraocular Movements: Extraocular movements intact.       Conjunctiva/sclera: Conjunctivae normal. Pupils: Pupils are equal, round, and reactive to light. Cardiovascular:      Rate and Rhythm: Normal rate and regular rhythm. Pulses: Normal pulses. Heart sounds: Normal heart sounds. Pulmonary:      Effort: Pulmonary effort is normal.      Breath sounds: Normal breath sounds. Abdominal:      General: Abdomen is flat. Bowel sounds are normal.      Palpations: Abdomen is soft. Musculoskeletal:         General: Normal range of motion. Right shoulder: Normal.      Left shoulder: Normal.      Right upper arm: Normal.      Left upper arm: Normal.      Right elbow: Normal.      Left elbow: Normal.      Right forearm: Normal.      Left forearm: Normal.      Right wrist: Normal.      Left wrist: Normal.      Right hand: Normal.      Left hand: Normal.      Cervical back: Normal, normal range of motion and neck supple. Lumbar back: Normal.   Skin:     General: Skin is warm and dry. Neurological:      General: No focal deficit present. Mental Status: She is alert and oriented to person, place, and time. Mental status is at baseline. Psychiatric:         Mood and Affect: Mood normal.         Behavior: Behavior normal.         Thought Content: Thought content normal.         Judgment: Judgment normal.             ASSESSMENT/PLAN:  1. Initial Medicare annual wellness visit  2. Mixed hyperlipidemia  -     Comprehensive Metabolic Panel; Future  -     Lipid, Fasting; Future  3. Type 2 diabetes mellitus without complication, without long-term current use of insulin (HCC)  -     Hemoglobin A1C; Future  -     Microalbumin / Creatinine Urine Ratio; Future  4. Acquired hypothyroidism  -     T4, Free; Future  -     TSH; Future  5. Age-related osteoporosis without current pathological fracture  -     DEXA BONE DENSITY 2 SITES; Future      Return for Medicare Annual Wellness Visit in 1 year. An electronic signature was used to authenticate this note.     --Neo Swanson MD No

## 2023-08-27 NOTE — H&P ADULT - PROBLEM SELECTOR PLAN 4
acute on chronic, recurrent  pt son advised follup with urology outpatient given recurrent history  UA negative   monitor i/os  monitor course  pain control, bm regimen acute on chronic, recurrent  pt son advised follup with urology outpatient given recurrent history  UA negative   monitor i/os  monitor course  pain control, bm regimen  consider uro consult if symptoms persist or worsen

## 2023-08-27 NOTE — DISCHARGE NOTE NURSING/CASE MANAGEMENT/SOCIAL WORK - NSCORESITESY/N_GEN_A_CORE_RD
----- Message from Mimi St MD sent at 3/23/2021  3:45 PM EDT -----  Geoff Kitchen,   Thyroid levels looks great, no changes in methimazole dose.  Should repeat his TSH with his primary clinic in about 4-5 months,     Thanks,
I called  Evelyn Alyssa and relayed the message from Dr. Le Mullen. He understood this information.   Allie Cheadle
No

## 2023-08-27 NOTE — H&P ADULT - TIME BILLING
care coordination, translation utlization services, discussed plan of care with patient face to face with  and son/hcp

## 2023-08-27 NOTE — H&P ADULT - NSHPSOCIALHISTORY_GEN_ALL_CORE
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

## 2023-08-28 ENCOUNTER — TRANSCRIPTION ENCOUNTER (OUTPATIENT)
Age: 68
End: 2023-08-28

## 2023-08-28 VITALS
RESPIRATION RATE: 16 BRPM | HEART RATE: 68 BPM | OXYGEN SATURATION: 95 % | SYSTOLIC BLOOD PRESSURE: 108 MMHG | TEMPERATURE: 98 F | DIASTOLIC BLOOD PRESSURE: 69 MMHG

## 2023-08-28 LAB
24R-OH-CALCIDIOL SERPL-MCNC: 29.4 NG/ML — LOW (ref 30–80)
ALBUMIN SERPL ELPH-MCNC: 3.8 G/DL — SIGNIFICANT CHANGE UP (ref 3.3–5)
ALP SERPL-CCNC: 52 U/L — SIGNIFICANT CHANGE UP (ref 30–120)
ALT FLD-CCNC: 34 U/L — SIGNIFICANT CHANGE UP (ref 10–60)
ANION GAP SERPL CALC-SCNC: 11 MMOL/L — SIGNIFICANT CHANGE UP (ref 5–17)
AST SERPL-CCNC: 24 U/L — SIGNIFICANT CHANGE UP (ref 10–40)
BASOPHILS # BLD AUTO: 0.04 K/UL — SIGNIFICANT CHANGE UP (ref 0–0.2)
BASOPHILS NFR BLD AUTO: 0.7 % — SIGNIFICANT CHANGE UP (ref 0–2)
BILIRUB SERPL-MCNC: 0.4 MG/DL — SIGNIFICANT CHANGE UP (ref 0.2–1.2)
BUN SERPL-MCNC: 20 MG/DL — SIGNIFICANT CHANGE UP (ref 7–23)
CALCIUM SERPL-MCNC: 9.1 MG/DL — SIGNIFICANT CHANGE UP (ref 8.4–10.5)
CHLORIDE SERPL-SCNC: 105 MMOL/L — SIGNIFICANT CHANGE UP (ref 96–108)
CO2 SERPL-SCNC: 24 MMOL/L — SIGNIFICANT CHANGE UP (ref 22–31)
CREAT SERPL-MCNC: 0.57 MG/DL — SIGNIFICANT CHANGE UP (ref 0.5–1.3)
EGFR: 100 ML/MIN/1.73M2 — SIGNIFICANT CHANGE UP
EOSINOPHIL # BLD AUTO: 0.2 K/UL — SIGNIFICANT CHANGE UP (ref 0–0.5)
EOSINOPHIL NFR BLD AUTO: 3.5 % — SIGNIFICANT CHANGE UP (ref 0–6)
GLUCOSE SERPL-MCNC: 110 MG/DL — HIGH (ref 70–99)
HCT VFR BLD CALC: 40.7 % — SIGNIFICANT CHANGE UP (ref 34.5–45)
HGB BLD-MCNC: 13.5 G/DL — SIGNIFICANT CHANGE UP (ref 11.5–15.5)
IMM GRANULOCYTES NFR BLD AUTO: 0.4 % — SIGNIFICANT CHANGE UP (ref 0–0.9)
LYMPHOCYTES # BLD AUTO: 1.64 K/UL — SIGNIFICANT CHANGE UP (ref 1–3.3)
LYMPHOCYTES # BLD AUTO: 29 % — SIGNIFICANT CHANGE UP (ref 13–44)
MAGNESIUM SERPL-MCNC: 2.1 MG/DL — SIGNIFICANT CHANGE UP (ref 1.6–2.6)
MCHC RBC-ENTMCNC: 31.3 PG — SIGNIFICANT CHANGE UP (ref 27–34)
MCHC RBC-ENTMCNC: 33.2 GM/DL — SIGNIFICANT CHANGE UP (ref 32–36)
MCV RBC AUTO: 94.4 FL — SIGNIFICANT CHANGE UP (ref 80–100)
MONOCYTES # BLD AUTO: 0.39 K/UL — SIGNIFICANT CHANGE UP (ref 0–0.9)
MONOCYTES NFR BLD AUTO: 6.9 % — SIGNIFICANT CHANGE UP (ref 2–14)
NEUTROPHILS # BLD AUTO: 3.36 K/UL — SIGNIFICANT CHANGE UP (ref 1.8–7.4)
NEUTROPHILS NFR BLD AUTO: 59.5 % — SIGNIFICANT CHANGE UP (ref 43–77)
NRBC # BLD: 0 /100 WBCS — SIGNIFICANT CHANGE UP (ref 0–0)
PHOSPHATE SERPL-MCNC: 3.6 MG/DL — SIGNIFICANT CHANGE UP (ref 2.5–4.5)
PLATELET # BLD AUTO: 168 K/UL — SIGNIFICANT CHANGE UP (ref 150–400)
POTASSIUM SERPL-MCNC: 4.2 MMOL/L — SIGNIFICANT CHANGE UP (ref 3.5–5.3)
POTASSIUM SERPL-SCNC: 4.2 MMOL/L — SIGNIFICANT CHANGE UP (ref 3.5–5.3)
PROT SERPL-MCNC: 7.8 G/DL — SIGNIFICANT CHANGE UP (ref 6–8.3)
RBC # BLD: 4.31 M/UL — SIGNIFICANT CHANGE UP (ref 3.8–5.2)
RBC # FLD: 12.5 % — SIGNIFICANT CHANGE UP (ref 10.3–14.5)
SODIUM SERPL-SCNC: 140 MMOL/L — SIGNIFICANT CHANGE UP (ref 135–145)
T4 FREE+ TSH PNL SERPL: 0.65 UIU/ML — SIGNIFICANT CHANGE UP (ref 0.27–4.2)
WBC # BLD: 5.65 K/UL — SIGNIFICANT CHANGE UP (ref 3.8–10.5)
WBC # FLD AUTO: 5.65 K/UL — SIGNIFICANT CHANGE UP (ref 3.8–10.5)

## 2023-08-28 PROCEDURE — 99232 SBSQ HOSP IP/OBS MODERATE 35: CPT

## 2023-08-28 PROCEDURE — 76770 US EXAM ABDO BACK WALL COMP: CPT | Mod: 26

## 2023-08-28 RX ORDER — SENNA PLUS 8.6 MG/1
2 TABLET ORAL
Qty: 0 | Refills: 0 | DISCHARGE
Start: 2023-08-28

## 2023-08-28 RX ORDER — ASPIRIN/CALCIUM CARB/MAGNESIUM 324 MG
1 TABLET ORAL
Qty: 0 | Refills: 0 | DISCHARGE
Start: 2023-08-28

## 2023-08-28 RX ADMIN — LOSARTAN POTASSIUM 100 MILLIGRAM(S): 100 TABLET, FILM COATED ORAL at 05:57

## 2023-08-28 RX ADMIN — ENOXAPARIN SODIUM 40 MILLIGRAM(S): 100 INJECTION SUBCUTANEOUS at 12:11

## 2023-08-28 RX ADMIN — Medication 81 MILLIGRAM(S): at 12:10

## 2023-08-28 RX ADMIN — POLYETHYLENE GLYCOL 3350 17 GRAM(S): 17 POWDER, FOR SOLUTION ORAL at 10:54

## 2023-08-28 RX ADMIN — PANTOPRAZOLE SODIUM 40 MILLIGRAM(S): 20 TABLET, DELAYED RELEASE ORAL at 05:57

## 2023-08-28 RX ADMIN — Medication 1000 UNIT(S): at 12:10

## 2023-08-28 RX ADMIN — Medication 25 MILLIGRAM(S): at 05:57

## 2023-08-28 NOTE — DISCHARGE NOTE PROVIDER - PROVIDER TOKENS
PROVIDER:[TOKEN:[[id number]],FOLLOWUP:[1-3 days]],PROVIDER:[TOKEN:[[id number]],FOLLOWUP:[1 week]]

## 2023-08-28 NOTE — PROGRESS NOTE ADULT - PROBLEM SELECTOR PLAN 1
-acute sec to pain and elevated BP, suspected  -PT eval done  -A1C 6.5  -check echo - normal EF  -f/up Neuro consult Dr. Ellis  -will start 81mg asa  CT angio shows 3mm right ICA aneurysm - report given to patient  -will need outpt neuro IR or neurosurgery f/up

## 2023-08-28 NOTE — CARE COORDINATION ASSESSMENT. - NSPASTMEDSURGHISTORY_GEN_ALL_CORE_FT
PAST MEDICAL & SURGICAL HISTORY:  History of tachycardia      Osteoporosis      Kidney stones      HTN (hypertension)      HLD (hyperlipidemia)      Hiatal hernia      GERD (gastroesophageal reflux disease)      Pregnancy w/ hx of uterine myomectomy      H/O prior ablation treatment

## 2023-08-28 NOTE — PROGRESS NOTE ADULT - SUBJECTIVE AND OBJECTIVE BOX
HPI:  Patient is a 68y old  Female who was Dxed with L ureteral stone in the ED a month ago ( managed conservatively, instructed to drink liberally ) who presented to ED early this morning c/o L flank to groin pain and L arm and leg numbness.   CT angio of brain, neck failed to reveal acute pathology. Her serum Na over night was 122 from 143 a month ago. She has been drinking at least 8 bottles of water a day in addition to other fluids.   Renal evaluation requested to assist in the management of hypoNa.       PAST MEDICAL & SURGICAL HISTORY:  GERD (gastroesophageal reflux disease)  Hiatal hernia  HLD (hyperlipidemia)  HTN (hypertension)  Kidney stones  Osteoporosis  History of tachycardia  H/O prior ablation treatment  Pregnancy w/ hx of uterine myomectomy        Allergies:  penicillin (Rash (Moderate))          MEDICATIONS  (STANDING):  aspirin  chewable 81 milliGRAM(s) Oral daily  atorvastatin 10 milliGRAM(s) Oral at bedtime  cholecalciferol 1000 Unit(s) Oral daily  enoxaparin Injectable 40 milliGRAM(s) SubCutaneous every 24 hours  losartan 100 milliGRAM(s) Oral daily  metoprolol succinate ER 25 milliGRAM(s) Oral daily  pantoprazole    Tablet 40 milliGRAM(s) Oral before breakfast  senna 2 Tablet(s) Oral at bedtime  sodium chloride 0.9% Bolus 1000 milliLiter(s) IV Bolus once    MEDICATIONS  (PRN):  oxycodone    5 mG/acetaminophen 325 mG 1 Tablet(s) Oral every 6 hours PRN for severe pain  polyethylene glycol 3350 17 Gram(s) Oral daily PRN Constipation      Daily Height in cm: 152.4 (27 Aug 2023 01:37)    Daily     Drug Dosing Weight  Height (cm): 152.4 (27 Aug 2023 01:37)  Weight (kg): 52.3 (27 Aug 2023 01:37)  BMI (kg/m2): 22.5 (27 Aug 2023 01:37)  BSA (m2): 1.48 (27 Aug 2023 01:37)      REVIEW OF SYSTEMS:    L groin pain  L arm and leg numbness  7mm kidney stone a month ago            I&O's Detail        PHYSICAL EXAM:    GENERAL: anxious  HEAD:  Atraumatic, normocephalic  CHEST/LUNG: Clear to auscultation bilaterally  HEART: Regular rate and rhythm. No murmurs, rubs, or gallops  ABDOMEN: Soft, Nontender, Nondistended. POS BS  EXTREMITIES:  no edema      LABS:  CBC Full  -  ( 27 Aug 2023 03:13 )  WBC Count : 7.36 K/uL  RBC Count : 3.87 M/uL  Hemoglobin : 11.9 g/dL  Hematocrit : 36.6 %  Platelet Count - Automated : 170 K/uL  Mean Cell Volume : 94.6 fl  Mean Cell Hemoglobin : 30.7 pg  Mean Cell Hemoglobin Concentration : 32.5 gm/dL  Auto Neutrophil # : 4.82 K/uL  Auto Lymphocyte # : 1.82 K/uL  Auto Monocyte # : 0.41 K/uL  Auto Eosinophil # : 0.19 K/uL  Auto Basophil # : 0.05 K/uL  Auto Neutrophil % : 65.4 %  Auto Lymphocyte % : 24.7 %  Auto Monocyte % : 5.6 %  Auto Eosinophil % : 2.6 %  Auto Basophil % : 0.7 %    08-27    122<L>  |  90<L>  |  15  ----------------------------<  124<H>  3.6   |  21<L>  |  0.68    Ca    7.6<L>      27 Aug 2023 03:13    TPro  7.3  /  Alb  3.4  /  TBili  0.2  /  DBili  x   /  AST  29  /  ALT  19  /  AlkPhos  41  08-27        Impression:  * HypoNa -- symptomatic, suspect acute. DDx: water intoxication, translocational ( drawn within 30 min of IV dye administration)  * Acute neurological changes. Brain CT stroke protocol NEG for acute path. ? related to hypoNa.   * 7mm L ureteral stone Dxed a month ago and managed conservatively.     Recommendations:   Repeat BMP STAT to follow serum Na trend  UA, spot urine Na,  urine Osm, Uric acid, TSH, am Cortisol requested  Limit oral fluids to 1000cc/day  Consider repeat CT A/P without contrast to reeval stone position.   
Patient is a 68y old  Female who presents with a chief complaint of hyponatremia (28 Aug 2023 13:06)    Patient seen in follow up for hyponatremia.        PAST MEDICAL HISTORY:  Hypertension    GERD (gastroesophageal reflux disease)    Hiatal hernia    HLD (hyperlipidemia)    HTN (hypertension)    Kidney stones    Osteoporosis    History of tachycardia      MEDICATIONS  (STANDING):  aspirin  chewable 81 milliGRAM(s) Oral daily  atorvastatin 10 milliGRAM(s) Oral at bedtime  cholecalciferol 1000 Unit(s) Oral daily  enoxaparin Injectable 40 milliGRAM(s) SubCutaneous every 24 hours  losartan 100 milliGRAM(s) Oral daily  metoprolol succinate ER 25 milliGRAM(s) Oral daily  pantoprazole    Tablet 40 milliGRAM(s) Oral before breakfast  senna 2 Tablet(s) Oral at bedtime  tamsulosin 0.4 milliGRAM(s) Oral at bedtime    MEDICATIONS  (PRN):  oxycodone    5 mG/acetaminophen 325 mG 1 Tablet(s) Oral every 6 hours PRN for severe pain  polyethylene glycol 3350 17 Gram(s) Oral daily PRN Constipation    T(C): 36.6 (08-28-23 @ 08:04), Max: 36.9 (08-28-23 @ 05:54)  HR: 63 (08-28-23 @ 08:04) (58 - 82)  BP: 112/71 (08-28-23 @ 08:04) (112/71 - 141/85)  RR: 16 (08-28-23 @ 08:04) (16 - 18)  SpO2: 96% (08-28-23 @ 08:04) (96% - 98%)  Wt(kg): --  I&O's Detail      PHYSICAL EXAM:  General: No distress  Respiratory: b/l air entry  Cardiovascular: S1 S2  Gastrointestinal: soft  Extremities:  no edema                              13.5   5.65  )-----------( 168      ( 28 Aug 2023 06:30 )             40.7     08-28    140  |  105  |  20  ----------------------------<  110<H>  4.2   |  24  |  0.57    Ca    9.1      28 Aug 2023 06:30  Phos  3.6     08-28  Mg     2.1     08-28    TPro  7.8  /  Alb  3.8  /  TBili  0.4  /  DBili  x   /  AST  24  /  ALT  34  /  AlkPhos  52  08-28        LIVER FUNCTIONS - ( 28 Aug 2023 06:30 )  Alb: 3.8 g/dL / Pro: 7.8 g/dL / ALK PHOS: 52 U/L / ALT: 34 U/L / AST: 24 U/L / GGT: x           Urinalysis Basic - ( 28 Aug 2023 06:30 )    Color: x / Appearance: x / SG: x / pH: x  Gluc: 110 mg/dL / Ketone: x  / Bili: x / Urobili: x   Blood: x / Protein: x / Nitrite: x   Leuk Esterase: x / RBC: x / WBC x   Sq Epi: x / Non Sq Epi: x / Bacteria: x        Sodium, Serum: 140 (08-28 @ 06:30)  Sodium, Serum: 140 (08-27 @ 07:19)  Sodium, Serum: 122 (08-27 @ 03:13)    Creatinine, Serum: 0.57 (08-28 @ 06:30)  Creatinine, Serum: 0.61 (08-27 @ 07:19)  Creatinine, Serum: 0.68 (08-27 @ 03:13)    Potassium, Serum: 4.2 (08-28 @ 06:30)  Potassium, Serum: 3.9 (08-27 @ 07:19)  Potassium, Serum: 3.6 (08-27 @ 03:13)    Hemoglobin: 13.5 (08-28 @ 06:30)  Hemoglobin: 11.9 (08-27 @ 03:13)    
Patient is a 68y Female with a known history of :  TIA (transient ischemic attack) [G45.9]    Hyponatremia [E87.1]    HTN (hypertension) [I10]    Nephrolithiasis [N20.0]    HLD (hyperlipidemia) [E78.5]    Patient speaks only a foreign language [Z78.9]    Need for prophylactic measure [Z29.9]    Abnormal CT scan [R93.89]      HPI:  69yo mandarin speaking F with pmh HTN. HLD, recurrent nephrolithiasis for over 20 years,brought in by son for elevated blood pressure.  Patient has been diagnosed with a kidney stone 1 month ago and has been having pain from the kidney stone the son took her blood pressure tonight and found it to be elevated.  Patient also states that she has decreased power in her right left arm and left leg.  Son states that the symptoms started in the left and approximately 10 hours ago.  Symptoms have spread to involve the entire left arm and now the left leg patient describes no power but the son also states that she is describing a numbness.  Patient took oxycodone for the pain from the kidney stone.  No dizziness, no visual symptoms. At time of admission pt's symptoms were mostly resolved.     In the ED pt underwent multiple CT scans which were remarkable for R ICA 3mm aneurysm (discussed with son and recommended outpt follow up with cardio), L 7mm mid ureteral stone with mild hydronephrosis, non obstructing renal calculi.  CT head was unremarkable.    (27 Aug 2023 05:17)      REVIEW OF SYSTEMS:    CONSTITUTIONAL: No fever, weight loss, or fatigue  EYES: No eye pain, visual disturbances, or discharge  ENMT:  No difficulty hearing, tinnitus, vertigo; No sinus or throat pain  NECK: No pain or stiffness  BREASTS: No pain, masses, or nipple discharge  RESPIRATORY: No cough, wheezing, chills or hemoptysis; No shortness of breath  CARDIOVASCULAR: No chest pain, palpitations, dizziness, or leg swelling  GASTROINTESTINAL: No abdominal or epigastric pain. No nausea, vomiting, or hematemesis; No diarrhea or constipation. No melena or hematochezia.  GENITOURINARY: No dysuria, frequency, hematuria, or incontinence  NEUROLOGICAL: No headaches, memory loss, loss of strength, numbness, or tremors  SKIN: No itching, burning, rashes, or lesions   LYMPH NODES: No enlarged glands  ENDOCRINE: No heat or cold intolerance; No hair loss  MUSCULOSKELETAL: No joint pain or swelling; No muscle, back, or extremity pain  PSYCHIATRIC: No depression, anxiety, mood swings, or difficulty sleeping  HEME/LYMPH: No easy bruising, or bleeding gums  ALLERGY AND IMMUNOLOGIC: No hives or eczema    MEDICATIONS  (STANDING):  aspirin  chewable 81 milliGRAM(s) Oral daily  atorvastatin 10 milliGRAM(s) Oral at bedtime  cholecalciferol 1000 Unit(s) Oral daily  enoxaparin Injectable 40 milliGRAM(s) SubCutaneous every 24 hours  losartan 100 milliGRAM(s) Oral daily  metoprolol succinate ER 25 milliGRAM(s) Oral daily  pantoprazole    Tablet 40 milliGRAM(s) Oral before breakfast  senna 2 Tablet(s) Oral at bedtime  tamsulosin 0.4 milliGRAM(s) Oral at bedtime    MEDICATIONS  (PRN):  oxycodone    5 mG/acetaminophen 325 mG 1 Tablet(s) Oral every 6 hours PRN for severe pain  polyethylene glycol 3350 17 Gram(s) Oral daily PRN Constipation      ALLERGIES: penicillin (Rash (Moderate))      FAMILY HISTORY:      Social history:  Alochol:   Smoking:   Drug Use:   Marital Status:     PHYSICAL EXAMINATION:  -----------------------------  T(C): 36.9 (08-28-23 @ 05:54), Max: 36.9 (08-28-23 @ 05:54)  HR: 74 (08-28-23 @ 05:54) (58 - 74)  BP: 138/82 (08-28-23 @ 05:54) (133/80 - 138/82)  RR: 18 (08-28-23 @ 05:54) (18 - 18)  SpO2: 96% (08-28-23 @ 05:54) (96% - 97%)  Wt(kg): --        Constitutional: well developed, normal appearance, well groomed, well nourished, no deformities and no acute distress.   Eyes: the conjunctiva exhibited no abnormalities and the eyelids demonstrated no xanthelasmas.   HEENT: normal oral mucosa, no oral pallor and no oral cyanosis.   Neck: normal jugular venous A waves present, normal jugular venous V waves present and no jugular venous yadav A waves.   Pulmonary: no respiratory distress, normal respiratory rhythm and effort, no accessory muscle use and lungs were clear to auscultation bilaterally. Anteriorly  Cardiovascular: heart rate and rhythm were normal, normal S1 and S2 and no murmur, gallop, rub, heave or thrill are present.   Musculoskeletal: the gait could not be assessed.   Extremities: no clubbing of the fingernails, no localized cyanosis, no petechial hemorrhages and no ischemic changes.   Skin: normal skin color and pigmentation, no rash, no venous stasis, no skin lesions, no skin ulcer and no xanthoma was observed.        LABS:   --------  08-27    140  |  104  |  14  ----------------------------<  114<H>  3.9   |  26  |  0.61    Ca    8.9      27 Aug 2023 07:19    TPro  7.3  /  Alb  3.4  /  TBili  0.2  /  DBili  x   /  AST  29  /  ALT  19  /  AlkPhos  41  08-27                         11.9   7.36  )-----------( 170      ( 27 Aug 2023 03:13 )             36.6     PT/INR - ( 27 Aug 2023 03:13 )   PT: 10.5 sec;   INR: 0.94 ratio         PTT - ( 27 Aug 2023 03:13 )  PTT:29.9 sec      177 mg/dL, --, 60 mg/dL, 107 mg/dL          Radiology:    
Patient is a 68y old  Female who presents with a chief complaint of hyponatremia (28 Aug 2023 06:56)      INTERVAL History of Present Illness/OVERNIGHT EVENTS: daughter at bedside  Mandarin no. [id number]    MEDICATIONS  (STANDING):  aspirin  chewable 81 milliGRAM(s) Oral daily  atorvastatin 10 milliGRAM(s) Oral at bedtime  cholecalciferol 1000 Unit(s) Oral daily  enoxaparin Injectable 40 milliGRAM(s) SubCutaneous every 24 hours  losartan 100 milliGRAM(s) Oral daily  metoprolol succinate ER 25 milliGRAM(s) Oral daily  pantoprazole    Tablet 40 milliGRAM(s) Oral before breakfast  senna 2 Tablet(s) Oral at bedtime  tamsulosin 0.4 milliGRAM(s) Oral at bedtime    MEDICATIONS  (PRN):  oxycodone    5 mG/acetaminophen 325 mG 1 Tablet(s) Oral every 6 hours PRN for severe pain  polyethylene glycol 3350 17 Gram(s) Oral daily PRN Constipation      Allergies    penicillin (Rash (Moderate))    Intolerances        REVIEW OF SYSTEMS: left groin discomfort  Other systems currently negative unless otherwise specified above.    Vital Signs Last 24 Hrs  T(C): 36.6 (28 Aug 2023 08:04), Max: 36.9 (28 Aug 2023 05:54)  T(F): 97.9 (28 Aug 2023 08:04), Max: 98.4 (28 Aug 2023 05:54)  HR: 63 (28 Aug 2023 08:04) (58 - 74)  BP: 112/71 (28 Aug 2023 08:04) (112/71 - 138/82)  BP(mean): --  RR: 16 (28 Aug 2023 08:04) (16 - 18)  SpO2: 96% (28 Aug 2023 08:04) (96% - 97%)    Parameters below as of 28 Aug 2023 08:04  Patient On (Oxygen Delivery Method): room air            PHYSICAL EXAM:  GENERAL: No apparent distress, appears stated age  EYES: Conjunctiva and sclera clear, no discharge  ENMT: Moist mucous membranes, no nasal discharge  CHEST/LUNG: Clear to auscultation bilaterally, no wheeze or rales  HEART: Regular rhythm, no rubs or gallops  ABDOMEN: Soft, mild ttp left groin  EXTREMITIES:  No clubbing, cyanosis or edema  SKIN: No rash, no new discoloration      LABS:                        13.5   5.65  )-----------( 168      ( 28 Aug 2023 06:30 )             40.7     28 Aug 2023 06:30    140    |  105    |  20     ----------------------------<  110    4.2     |  24     |  0.57     Ca    9.1        28 Aug 2023 06:30  Phos  3.6       28 Aug 2023 06:30  Mg     2.1       28 Aug 2023 06:30    TPro  7.8    /  Alb  3.8    /  TBili  0.4    /  DBili  x      /  AST  24     /  ALT  34     /  AlkPhos  52     28 Aug 2023 06:30    PT/INR - ( 27 Aug 2023 03:13 )   PT: 10.5 sec;   INR: 0.94 ratio         PTT - ( 27 Aug 2023 03:13 )  PTT:29.9 sec  Urinalysis Basic - ( 28 Aug 2023 06:30 )    Color: x / Appearance: x / SG: x / pH: x  Gluc: 110 mg/dL / Ketone: x  / Bili: x / Urobili: x   Blood: x / Protein: x / Nitrite: x   Leuk Esterase: x / RBC: x / WBC x   Sq Epi: x / Non Sq Epi: x / Bacteria: x      CAPILLARY BLOOD GLUCOSE            RADIOLOGY & ADDITIONAL TESTS:      Images reviewed personally    Consultant Notes Reviewed and Care Discussed with relevant Consultants.

## 2023-08-28 NOTE — DISCHARGE NOTE PROVIDER - NSDCMRMEDTOKEN_GEN_ALL_CORE_FT
aspirin 81 mg oral tablet, chewable: 1 tab(s) orally once a day  atorvastatin 10 mg oral tablet: 1 tab(s) orally once a day  losartan 100 mg oral tablet: 1 tab(s) orally once a day  metoprolol succinate 25 mg oral tablet, extended release: 1 tab(s) orally once a day  Percocet 5 mg-325 mg oral tablet: 1 tab(s) orally every 8 hours as needed for  severe pain MDD: 3  senna leaf extract oral tablet: 2 tab(s) orally once a day (at bedtime)  Vitamin D3 25 mcg (1000 intl units) oral capsule: 1 cap(s) orally once a day

## 2023-08-28 NOTE — CARE COORDINATION ASSESSMENT. - NSCAREPROVIDERS_GEN_ALL_CORE_FT
CARE PROVIDERS:  Accepting Physician: Marcus Koroma  Administration: Roseann Cedillo  Administration: Minh Matute  Administration: Darius Murcia  Admitting: Christina Cross  Attending: Christina Cross  Case Management: Santaromana, Anna  Consultant: Ventura Lozano  Consultant: Carol Watson  Consultant: Kelly Lozano  Consultant: Miki Bello  Consultant: Rickey Powers  ED Attending: Souleymane Diallo  ED Nurse: Ashok Leo  Infection Control: Rosy Ortega  Nurse: Tamela Guevara  Nurse: Negar Butt  Nurse: Janeth Mena  PCA/Nursing Assistant: Moncho Cedeño  Primary Team: Nia Bianchi  Primary Team: Derrick Hsu  Primary Team: Cameron Ross  Primary Team: JOHN Duran  Quality Review: Janey Griffith  Registered Dietitian: Amber Carreon  : Erika Low  Team: PATIENCE  Hospitalists, Team  UR// Supp. Assoc.: Kaity Valentin

## 2023-08-28 NOTE — CAREGIVER ENGAGEMENT NOTE - CAREGIVER OUTREACH NOTES - FREE TEXT
no skilled service needs identified at DC; pt; daughter in agreement;   Transition of Care Plan  DC to home; no skilled needs identified; family will assume care;  to f/u with MD post DC; daughter Junior at bedside transport at DC.

## 2023-08-28 NOTE — DISCHARGE NOTE PROVIDER - CARE PROVIDER_API CALL
Rolf Herrmann  Urology  [address], [address]  [place] [address]  Phone: [phone]  Fax: [phone]  Follow Up Time: 1-3 days    Tan Blanton  Neurology  [address] [place] [address]  Phone: [phone]  Fax: [phone]  Follow Up Time: 1 week

## 2023-08-28 NOTE — DISCHARGE NOTE PROVIDER - HOSPITAL COURSE
69yo mandarin speaking F with pmh HTN. HLD, recurrent nephrolithiasis for over 20 years, brought in by son for elevated blood pressure.  Patient has been diagnosed with a kidney stone 1 month ago and has been having pain from the kidney stone the son took her blood pressure tonight and found it to be elevated.  Patient also states that she has decreased power in her right left arm and left leg.  Son states that the symptoms started in the left and approximately 10 hours ago.  Symptoms have spread to involve the entire left arm and now the left leg patient describes no power but the son also states that she is describing a numbness.  Patient took oxycodone for the pain from the kidney stone.  No dizziness, no visual symptoms. At time of admission pt's symptoms were mostly resolved.     In the ED pt underwent multiple CT scans which were remarkable for R ICA 3mm aneurysm (discussed with son and recommended outpt follow up with cardio), L 7mm mid ureteral stone with mild hydronephrosis, non obstructing renal calculi.  CT head was unremarkable.     No current neurological symptoms.  Hyponatremia resolved ?lab error.  Kidney sono:  Neurology suggests outpatient follow up.  CTA head/neck: IMPRESSION:  -3 mm right ICA terminus aneurysm.  -Patent cervical and intracranial major arterial vasculature without   evidence of occlusion, hemodynamically significant stenosis, or   dissection.     67yo mandarin speaking F with pmh HTN. HLD, recurrent nephrolithiasis for over 20 years, brought in by son for elevated blood pressure.  Patient has been diagnosed with a kidney stone 1 month ago and has been having pain from the kidney stone the son took her blood pressure tonight and found it to be elevated.  Patient also states that she has decreased power in her right left arm and left leg.  Son states that the symptoms started in the left and approximately 10 hours ago.  Symptoms have spread to involve the entire left arm and now the left leg patient describes no power but the son also states that she is describing a numbness.  Patient took oxycodone for the pain from the kidney stone.  No dizziness, no visual symptoms. At time of admission pt's symptoms were mostly resolved.     In the ED pt underwent multiple CT scans which were remarkable for R ICA 3mm aneurysm (discussed with son and recommended outpt follow up with cardio), L 7mm mid ureteral stone with mild hydronephrosis, non obstructing renal calculi.  CT head was unremarkable.     No current neurological symptoms.  Hyponatremia resolved ?lab error.  Kidney sono:  Neurology suggests outpatient follow up.  CTA head/neck: IMPRESSION:  -3 mm right ICA terminus aneurysm.  -Patent cervical and intracranial major arterial vasculature without   evidence of occlusion, hemodynamically significant stenosis, or   dissection.    patient did not want to see the neurology.  Daughter Eileen said that they don't want to see the neurologist.  They said they will leave AMA.  They understand the risks and harms of not being seen by neurology.  They understand the risks of going home and deterioration if not properly cleared for discharge.

## 2023-08-28 NOTE — PROGRESS NOTE ADULT - ASSESSMENT
69y/o non-English speaking. Seen at [facility] telemetry. Lying flat, comfortably  History mostly from chart and   History HTN, high cholesterol, kidney stone, GERD, hiatal hernia, OP  History of uterine myomectomy    Admitted for was having abdominal pain from kidney stone  Son found her blood pressure to be high  She took an oxycodone for the pain  According to chart, she also complained of left-sided weakness  No recent, significant cardiac complaints  Troponin-4.5    8/28/23  Seen at [facility] telemetry  Lying flat, sleeping comfortably  Easily arousable    Impression  Hypertension probably exacerbated by underlying pain from kidney stone    Plan:  - Continue Losartan-100mg OD                  Metoprolol succinate-25mg OD                  Atorvastatin-10mg HS  - Presently blood pressure is controlled  - If patient becomes hypertensive, then would add Amlodipine  - Abdominal CAT with 7mm left mid uteral stone  - Echocardiogram-normal  - Pain control  - Seen by Nephrology      
HypoNa -- symptomatic, suspect acute  7mm L ureteral stone Dxed a month ago and managed conservatively    Improved and stable sodium levels. To continue current meds. Encourage PO intake as tolerated.   D/w pt's daughter at bedside. 
69yo mandarin speaking F with pmh HTN. HLD, recurrent nephrolithiasis for over 20 years, brought in by son for elevated blood pressure. and L sided weakness admitted for suspected TIA and hyponatremia.

## 2023-08-28 NOTE — PROGRESS NOTE ADULT - PROBLEM SELECTOR PLAN 6
utilize mandarin speaking  for medical translation purposes as son limited but son is good compliment in translation as dialect is unique as well, however satisfied with  services

## 2023-08-28 NOTE — DISCHARGE NOTE PROVIDER - CARE PROVIDERS DIRECT ADDRESSES
[email or web address]

## 2023-08-28 NOTE — PROGRESS NOTE ADULT - PROBLEM SELECTOR PLAN 4
acute on chronic, recurrent  pt son advised follup with urology outpatient given recurrent history  UA negative   monitor i/os  f/up kidney sono r/o hydro

## 2023-08-28 NOTE — PROGRESS NOTE ADULT - NSPROGADDITIONALINFOA_GEN_ALL_CORE
possible dc home today with urology and neurosurgery f/up if kidney sono shows no hydro and cleared by neurology.  spent 40 mins

## 2023-11-13 PROCEDURE — 99285 EMERGENCY DEPT VISIT HI MDM: CPT

## 2023-11-13 PROCEDURE — 83735 ASSAY OF MAGNESIUM: CPT

## 2023-11-13 PROCEDURE — 80053 COMPREHEN METABOLIC PANEL: CPT

## 2023-11-13 PROCEDURE — 85025 COMPLETE CBC W/AUTO DIFF WBC: CPT

## 2023-11-13 PROCEDURE — 83036 HEMOGLOBIN GLYCOSYLATED A1C: CPT

## 2023-11-13 PROCEDURE — 84550 ASSAY OF BLOOD/URIC ACID: CPT

## 2023-11-13 PROCEDURE — 70496 CT ANGIOGRAPHY HEAD: CPT | Mod: MA

## 2023-11-13 PROCEDURE — 81001 URINALYSIS AUTO W/SCOPE: CPT

## 2023-11-13 PROCEDURE — 93306 TTE W/DOPPLER COMPLETE: CPT

## 2023-11-13 PROCEDURE — 76770 US EXAM ABDO BACK WALL COMP: CPT

## 2023-11-13 PROCEDURE — 82306 VITAMIN D 25 HYDROXY: CPT

## 2023-11-13 PROCEDURE — 84300 ASSAY OF URINE SODIUM: CPT

## 2023-11-13 PROCEDURE — 80048 BASIC METABOLIC PNL TOTAL CA: CPT

## 2023-11-13 PROCEDURE — 85610 PROTHROMBIN TIME: CPT

## 2023-11-13 PROCEDURE — 83930 ASSAY OF BLOOD OSMOLALITY: CPT

## 2023-11-13 PROCEDURE — 82962 GLUCOSE BLOOD TEST: CPT

## 2023-11-13 PROCEDURE — 84484 ASSAY OF TROPONIN QUANT: CPT

## 2023-11-13 PROCEDURE — 70498 CT ANGIOGRAPHY NECK: CPT | Mod: MA

## 2023-11-13 PROCEDURE — 85730 THROMBOPLASTIN TIME PARTIAL: CPT

## 2023-11-13 PROCEDURE — 97161 PT EVAL LOW COMPLEX 20 MIN: CPT

## 2023-11-13 PROCEDURE — 93005 ELECTROCARDIOGRAM TRACING: CPT

## 2023-11-13 PROCEDURE — 84100 ASSAY OF PHOSPHORUS: CPT

## 2023-11-13 PROCEDURE — 80061 LIPID PANEL: CPT

## 2023-11-13 PROCEDURE — 70450 CT HEAD/BRAIN W/O DYE: CPT | Mod: MA

## 2023-11-13 PROCEDURE — 36415 COLL VENOUS BLD VENIPUNCTURE: CPT

## 2023-11-13 PROCEDURE — 84443 ASSAY THYROID STIM HORMONE: CPT

## 2023-11-13 PROCEDURE — 83935 ASSAY OF URINE OSMOLALITY: CPT

## 2023-12-07 NOTE — DISCHARGE NOTE PROVIDER - EXTENDED VTE YES NO FOR MLM ENOXAPARIN
,
81 year old female w/PMHx COPD on 3LNC at home, HFrEF, multiple myeloma on promecta, former smoker, HTN, mood disorders, seizures, HLD, GERD, chronic O2 3L at home presenting with body aches, has hx of pneumonia and uti in the past, will check labs, urinalysis, follow up studies, reassess, dispo pending clinical course and results, if non significant source of symptoms stable for outpt given patient otherwise at baseline state of health currently
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
## 2024-02-07 NOTE — ED PROVIDER NOTE - DISCHARGE DATE
Render Post-Care Instructions In Note?: no Number Of Freeze-Thaw Cycles: 1 freeze-thaw cycle Detail Level: Detailed Show Aperture Variable?: Yes Post-Care Instructions: I reviewed with the patient in detail post-care instructions. Patient is to wear sunprotection, and avoid picking at any of the treated lesions. Pt may apply Vaseline to crusted or scabbing areas. Duration Of Freeze Thaw-Cycle (Seconds): 10 Consent: The patient's consent was obtained including but not limited to risks of crusting, scabbing, blistering, scarring, darker or lighter pigmentary change, recurrence, incomplete removal and infection. 01-Mar-2022

## 2024-04-03 RX ORDER — AMLODIPINE BESYLATE 2.5 MG/1
1 TABLET ORAL
Qty: 0 | Refills: 0 | DISCHARGE

## 2024-04-11 ENCOUNTER — EMERGENCY (EMERGENCY)
Facility: HOSPITAL | Age: 69
LOS: 1 days | Discharge: HOME | End: 2024-04-11
Attending: EMERGENCY MEDICINE | Admitting: EMERGENCY MEDICINE
Payer: MEDICARE

## 2024-04-11 VITALS
WEIGHT: 113.1 LBS | SYSTOLIC BLOOD PRESSURE: 154 MMHG | HEART RATE: 72 BPM | OXYGEN SATURATION: 97 % | HEIGHT: 60 IN | DIASTOLIC BLOOD PRESSURE: 90 MMHG | RESPIRATION RATE: 15 BRPM | TEMPERATURE: 98 F

## 2024-04-11 DIAGNOSIS — Z98.890 OTHER SPECIFIED POSTPROCEDURAL STATES: Chronic | ICD-10-CM

## 2024-04-11 DIAGNOSIS — O34.29 MATERNAL CARE DUE TO UTERINE SCAR FROM OTHER PREVIOUS SURGERY: Chronic | ICD-10-CM

## 2024-04-11 PROCEDURE — 99285 EMERGENCY DEPT VISIT HI MDM: CPT

## 2024-04-11 PROCEDURE — 99053 MED SERV 10PM-8AM 24 HR FAC: CPT

## 2024-04-11 NOTE — ED ADULT TRIAGE NOTE - INTERNATIONAL TRAVEL
Left Voicemail (1st Attempt) and Sent Mychart (1st Attempt) for the patient to call back and schedule the following:    Appointment type: Return  Provider: Denise Bland  Return date: Feb 2024  Specialty phone number: [phone]     No

## 2024-04-12 VITALS
DIASTOLIC BLOOD PRESSURE: 95 MMHG | HEART RATE: 66 BPM | SYSTOLIC BLOOD PRESSURE: 169 MMHG | OXYGEN SATURATION: 97 % | TEMPERATURE: 98 F | RESPIRATION RATE: 15 BRPM

## 2024-04-12 PROBLEM — Z87.898 PERSONAL HISTORY OF OTHER SPECIFIED CONDITIONS: Chronic | Status: ACTIVE | Noted: 2022-03-02

## 2024-04-12 PROBLEM — E78.5 HYPERLIPIDEMIA, UNSPECIFIED: Chronic | Status: ACTIVE | Noted: 2022-03-02

## 2024-04-12 PROBLEM — I10 ESSENTIAL (PRIMARY) HYPERTENSION: Chronic | Status: ACTIVE | Noted: 2022-03-02

## 2024-04-12 PROBLEM — K21.9 GASTRO-ESOPHAGEAL REFLUX DISEASE WITHOUT ESOPHAGITIS: Chronic | Status: ACTIVE | Noted: 2022-03-02

## 2024-04-12 PROBLEM — K44.9 DIAPHRAGMATIC HERNIA WITHOUT OBSTRUCTION OR GANGRENE: Chronic | Status: ACTIVE | Noted: 2022-03-02

## 2024-04-12 PROBLEM — M81.0 AGE-RELATED OSTEOPOROSIS WITHOUT CURRENT PATHOLOGICAL FRACTURE: Chronic | Status: ACTIVE | Noted: 2022-03-02

## 2024-04-12 PROBLEM — N20.0 CALCULUS OF KIDNEY: Chronic | Status: ACTIVE | Noted: 2022-03-02

## 2024-04-12 LAB
ALBUMIN SERPL ELPH-MCNC: 4.1 G/DL — SIGNIFICANT CHANGE UP (ref 3.3–5)
ALP SERPL-CCNC: 47 U/L — SIGNIFICANT CHANGE UP (ref 30–120)
ALT FLD-CCNC: 26 U/L — SIGNIFICANT CHANGE UP (ref 10–60)
ANION GAP SERPL CALC-SCNC: 5 MMOL/L — SIGNIFICANT CHANGE UP (ref 5–17)
APTT BLD: 30.1 SEC — SIGNIFICANT CHANGE UP (ref 24.5–35.6)
AST SERPL-CCNC: 19 U/L — SIGNIFICANT CHANGE UP (ref 10–40)
BASOPHILS # BLD AUTO: 0.05 K/UL — SIGNIFICANT CHANGE UP (ref 0–0.2)
BASOPHILS NFR BLD AUTO: 0.7 % — SIGNIFICANT CHANGE UP (ref 0–2)
BILIRUB SERPL-MCNC: 0.3 MG/DL — SIGNIFICANT CHANGE UP (ref 0.2–1.2)
BUN SERPL-MCNC: 17 MG/DL — SIGNIFICANT CHANGE UP (ref 7–23)
CALCIUM SERPL-MCNC: 9.6 MG/DL — SIGNIFICANT CHANGE UP (ref 8.4–10.5)
CHLORIDE SERPL-SCNC: 104 MMOL/L — SIGNIFICANT CHANGE UP (ref 96–108)
CO2 SERPL-SCNC: 29 MMOL/L — SIGNIFICANT CHANGE UP (ref 22–31)
CREAT SERPL-MCNC: 0.73 MG/DL — SIGNIFICANT CHANGE UP (ref 0.5–1.3)
EGFR: 91 ML/MIN/1.73M2 — SIGNIFICANT CHANGE UP
EOSINOPHIL # BLD AUTO: 0.14 K/UL — SIGNIFICANT CHANGE UP (ref 0–0.5)
EOSINOPHIL NFR BLD AUTO: 2 % — SIGNIFICANT CHANGE UP (ref 0–6)
GLUCOSE BLDC GLUCOMTR-MCNC: 105 MG/DL — HIGH (ref 70–99)
GLUCOSE SERPL-MCNC: 120 MG/DL — HIGH (ref 70–99)
HCT VFR BLD CALC: 38.1 % — SIGNIFICANT CHANGE UP (ref 34.5–45)
HGB BLD-MCNC: 13.2 G/DL — SIGNIFICANT CHANGE UP (ref 11.5–15.5)
IMM GRANULOCYTES NFR BLD AUTO: 0.1 % — SIGNIFICANT CHANGE UP (ref 0–0.9)
INR BLD: 0.87 RATIO — SIGNIFICANT CHANGE UP (ref 0.85–1.18)
LYMPHOCYTES # BLD AUTO: 2.64 K/UL — SIGNIFICANT CHANGE UP (ref 1–3.3)
LYMPHOCYTES # BLD AUTO: 37.2 % — SIGNIFICANT CHANGE UP (ref 13–44)
MCHC RBC-ENTMCNC: 32.1 PG — SIGNIFICANT CHANGE UP (ref 27–34)
MCHC RBC-ENTMCNC: 34.6 GM/DL — SIGNIFICANT CHANGE UP (ref 32–36)
MCV RBC AUTO: 92.7 FL — SIGNIFICANT CHANGE UP (ref 80–100)
MONOCYTES # BLD AUTO: 0.44 K/UL — SIGNIFICANT CHANGE UP (ref 0–0.9)
MONOCYTES NFR BLD AUTO: 6.2 % — SIGNIFICANT CHANGE UP (ref 2–14)
NEUTROPHILS # BLD AUTO: 3.82 K/UL — SIGNIFICANT CHANGE UP (ref 1.8–7.4)
NEUTROPHILS NFR BLD AUTO: 53.8 % — SIGNIFICANT CHANGE UP (ref 43–77)
NRBC # BLD: 0 /100 WBCS — SIGNIFICANT CHANGE UP (ref 0–0)
PLATELET # BLD AUTO: 236 K/UL — SIGNIFICANT CHANGE UP (ref 150–400)
POTASSIUM SERPL-MCNC: 3.7 MMOL/L — SIGNIFICANT CHANGE UP (ref 3.5–5.3)
POTASSIUM SERPL-SCNC: 3.7 MMOL/L — SIGNIFICANT CHANGE UP (ref 3.5–5.3)
PROT SERPL-MCNC: 8.4 G/DL — HIGH (ref 6–8.3)
PROTHROM AB SERPL-ACNC: 9.5 SEC — SIGNIFICANT CHANGE UP (ref 9.5–13)
RBC # BLD: 4.11 M/UL — SIGNIFICANT CHANGE UP (ref 3.8–5.2)
RBC # FLD: 12.2 % — SIGNIFICANT CHANGE UP (ref 10.3–14.5)
SODIUM SERPL-SCNC: 138 MMOL/L — SIGNIFICANT CHANGE UP (ref 135–145)
WBC # BLD: 7.1 K/UL — SIGNIFICANT CHANGE UP (ref 3.8–10.5)
WBC # FLD AUTO: 7.1 K/UL — SIGNIFICANT CHANGE UP (ref 3.8–10.5)

## 2024-04-12 PROCEDURE — 93010 ELECTROCARDIOGRAM REPORT: CPT

## 2024-04-12 PROCEDURE — 99285 EMERGENCY DEPT VISIT HI MDM: CPT | Mod: 25

## 2024-04-12 PROCEDURE — 70498 CT ANGIOGRAPHY NECK: CPT | Mod: 26,MC

## 2024-04-12 PROCEDURE — 85730 THROMBOPLASTIN TIME PARTIAL: CPT

## 2024-04-12 PROCEDURE — 93005 ELECTROCARDIOGRAM TRACING: CPT

## 2024-04-12 PROCEDURE — 70450 CT HEAD/BRAIN W/O DYE: CPT | Mod: MC

## 2024-04-12 PROCEDURE — 70496 CT ANGIOGRAPHY HEAD: CPT | Mod: MC

## 2024-04-12 PROCEDURE — 85610 PROTHROMBIN TIME: CPT

## 2024-04-12 PROCEDURE — 70496 CT ANGIOGRAPHY HEAD: CPT | Mod: 26,MC

## 2024-04-12 PROCEDURE — 80053 COMPREHEN METABOLIC PANEL: CPT

## 2024-04-12 PROCEDURE — 82962 GLUCOSE BLOOD TEST: CPT

## 2024-04-12 PROCEDURE — 70498 CT ANGIOGRAPHY NECK: CPT | Mod: MC

## 2024-04-12 PROCEDURE — 36415 COLL VENOUS BLD VENIPUNCTURE: CPT

## 2024-04-12 PROCEDURE — 85025 COMPLETE CBC W/AUTO DIFF WBC: CPT

## 2024-04-12 NOTE — ED ADULT NURSE REASSESSMENT NOTE - NS ED NURSE REASSESS COMMENT FT1
pt up and about on unit with steady gait. MD aware of all results. pt d/c to son in NAD. encouraged to follow up with PCP as soon as possible

## 2024-04-12 NOTE — ED PROVIDER NOTE - CLINICAL SUMMARY MEDICAL DECISION MAKING FREE TEXT BOX
Patient with sudden onset of dizziness and high blood pressure.  Complains of headache.  Has a known aneurysm.  Will get CAT scan to rule out bleeding patient had a neurologic exam at this time.  Blood pressure has improved without intervention.

## 2024-04-12 NOTE — ED ADULT NURSE NOTE - OBJECTIVE STATEMENT
pt to ED with her son; speaks [facility] dialect and decline  use; reports his mother had high blood pressure at home; 190's. she is c/o dizziness and headache. took Meclizine prior to coming to the hospital

## 2024-04-12 NOTE — ED PROVIDER NOTE - PROGRESS NOTE DETAILS
Patient feeling improved. Has mild "tightness" in her head but does not want anything for it. CT results reviewed, with stress placed on the finding of the OP lesion. Son states he will take her to her PCP so he can refer her to specialists who speak her language

## 2024-04-12 NOTE — ED ADULT NURSE NOTE - NSFALLHARMRISKINTERV_ED_ALL_ED
Assistance OOB with selected safe patient handling equipment if applicable/Assistance with ambulation/Communicate risk of Fall with Harm to all staff, patient, and family/Encourage patient to sit up slowly, dangle for a short time, stand at bedside before walking/Monitor gait and stability/Orthostatic vital signs/Provide patient with walking aids/Provide visual cue: red socks, yellow wristband, yellow gown, etc/Reinforce activity limits and safety measures with patient and family/Bed in lowest position, wheels locked, appropriate side rails in place/Call bell, personal items and telephone in reach/Instruct patient to call for assistance before getting out of bed/chair/stretcher/Non-slip footwear applied when patient is off stretcher/[place] to call system/Physically safe environment - no spills, clutter or unnecessary equipment/Purposeful Proactive Rounding/Room/bathroom lighting operational, light cord in reach

## 2024-04-12 NOTE — ED PROVIDER NOTE - PATIENT PORTAL LINK FT
You can access the FollowMyHealth Patient Portal offered by [facility] by registering at the following website: [email or web address]. By joining [facility]’s FollowMyHealth portal, you will also be able to view your health information using other applications (apps) compatible with our system.

## 2024-04-12 NOTE — ED PROVIDER NOTE - OBJECTIVE STATEMENT
Patient presents onset of dizziness and headache approximately 2 hours ago.  Patient checked her blood pressure and it was elevated.  Patient has a history of high blood pressure and states that she gets this dizziness anytime her blood pressure goes high.  Her son states this happens all the time.  Patient states the dizziness is like a spinning sensation.  Took Bonine and states the dizziness is improving.  Patient had similar symptoms last year and was seen here.  Found to have a small aneurysm in the brain.  Patient due for follow-up but has not yet had it.  No weakness in arms or legs.  No change in vision or speech.

## 2024-04-15 ENCOUNTER — APPOINTMENT (OUTPATIENT)
Dept: CARDIOLOGY | Facility: CLINIC | Age: 69
End: 2024-04-15
Payer: MEDICARE

## 2024-04-15 ENCOUNTER — NON-APPOINTMENT (OUTPATIENT)
Age: 69
End: 2024-04-15

## 2024-04-15 VITALS
DIASTOLIC BLOOD PRESSURE: 75 MMHG | HEART RATE: 73 BPM | SYSTOLIC BLOOD PRESSURE: 110 MMHG | WEIGHT: 111 LBS | OXYGEN SATURATION: 97 % | RESPIRATION RATE: 18 BRPM | BODY MASS INDEX: 22.42 KG/M2 | TEMPERATURE: 97.7 F

## 2024-04-15 DIAGNOSIS — I10 ESSENTIAL (PRIMARY) HYPERTENSION: ICD-10-CM

## 2024-04-15 DIAGNOSIS — R07.89 OTHER CHEST PAIN: ICD-10-CM

## 2024-04-15 PROCEDURE — 93306 TTE W/DOPPLER COMPLETE: CPT

## 2024-04-15 PROCEDURE — ZZZZZ: CPT

## 2024-04-15 PROCEDURE — 93000 ELECTROCARDIOGRAM COMPLETE: CPT | Mod: 59

## 2024-04-15 PROCEDURE — 93015 CV STRESS TEST SUPVJ I&R: CPT

## 2024-04-15 PROCEDURE — 99214 OFFICE O/P EST MOD 30 MIN: CPT | Mod: 25

## 2024-05-05 ENCOUNTER — EMERGENCY (EMERGENCY)
Facility: HOSPITAL | Age: 69
LOS: 1 days | Discharge: HOME | End: 2024-05-05
Attending: EMERGENCY MEDICINE
Payer: MEDICARE

## 2024-05-05 VITALS
HEIGHT: 60 IN | HEART RATE: 61 BPM | TEMPERATURE: 98 F | SYSTOLIC BLOOD PRESSURE: 122 MMHG | OXYGEN SATURATION: 98 % | DIASTOLIC BLOOD PRESSURE: 78 MMHG | RESPIRATION RATE: 17 BRPM

## 2024-05-05 VITALS
RESPIRATION RATE: 18 BRPM | TEMPERATURE: 98 F | OXYGEN SATURATION: 98 % | HEART RATE: 77 BPM | DIASTOLIC BLOOD PRESSURE: 76 MMHG | SYSTOLIC BLOOD PRESSURE: 115 MMHG

## 2024-05-05 DIAGNOSIS — Z98.890 OTHER SPECIFIED POSTPROCEDURAL STATES: Chronic | ICD-10-CM

## 2024-05-05 DIAGNOSIS — O34.29 MATERNAL CARE DUE TO UTERINE SCAR FROM OTHER PREVIOUS SURGERY: Chronic | ICD-10-CM

## 2024-05-05 LAB
ALBUMIN SERPL ELPH-MCNC: 4 G/DL — SIGNIFICANT CHANGE UP (ref 3.3–5)
ALP SERPL-CCNC: 46 U/L — SIGNIFICANT CHANGE UP (ref 40–120)
ALT FLD-CCNC: 22 U/L — SIGNIFICANT CHANGE UP (ref 10–45)
ANION GAP SERPL CALC-SCNC: 11 MMOL/L — SIGNIFICANT CHANGE UP (ref 5–17)
ANION GAP SERPL CALC-SCNC: 12 MMOL/L — SIGNIFICANT CHANGE UP (ref 5–17)
AST SERPL-CCNC: 46 U/L — HIGH (ref 10–40)
BASE EXCESS BLDV CALC-SCNC: 2.5 MMOL/L — SIGNIFICANT CHANGE UP (ref -2–3)
BASE EXCESS BLDV CALC-SCNC: 3 MMOL/L — SIGNIFICANT CHANGE UP (ref -2–3)
BASOPHILS # BLD AUTO: 0.07 K/UL — SIGNIFICANT CHANGE UP (ref 0–0.2)
BASOPHILS NFR BLD AUTO: 1 % — SIGNIFICANT CHANGE UP (ref 0–2)
BILIRUB SERPL-MCNC: 0.3 MG/DL — SIGNIFICANT CHANGE UP (ref 0.2–1.2)
BUN SERPL-MCNC: 12 MG/DL — SIGNIFICANT CHANGE UP (ref 7–23)
BUN SERPL-MCNC: 12 MG/DL — SIGNIFICANT CHANGE UP (ref 7–23)
CA-I SERPL-SCNC: 1.22 MMOL/L — SIGNIFICANT CHANGE UP (ref 1.15–1.33)
CA-I SERPL-SCNC: 1.25 MMOL/L — SIGNIFICANT CHANGE UP (ref 1.15–1.33)
CALCIUM SERPL-MCNC: 9.3 MG/DL — SIGNIFICANT CHANGE UP (ref 8.4–10.5)
CALCIUM SERPL-MCNC: 9.3 MG/DL — SIGNIFICANT CHANGE UP (ref 8.4–10.5)
CHLORIDE BLDV-SCNC: 104 MMOL/L — SIGNIFICANT CHANGE UP (ref 96–108)
CHLORIDE BLDV-SCNC: 105 MMOL/L — SIGNIFICANT CHANGE UP (ref 96–108)
CHLORIDE SERPL-SCNC: 104 MMOL/L — SIGNIFICANT CHANGE UP (ref 96–108)
CHLORIDE SERPL-SCNC: 105 MMOL/L — SIGNIFICANT CHANGE UP (ref 96–108)
CO2 BLDV-SCNC: 29 MMOL/L — HIGH (ref 22–26)
CO2 BLDV-SCNC: 30 MMOL/L — HIGH (ref 22–26)
CO2 SERPL-SCNC: 23 MMOL/L — SIGNIFICANT CHANGE UP (ref 22–31)
CO2 SERPL-SCNC: 25 MMOL/L — SIGNIFICANT CHANGE UP (ref 22–31)
CREAT SERPL-MCNC: 0.62 MG/DL — SIGNIFICANT CHANGE UP (ref 0.5–1.3)
CREAT SERPL-MCNC: 0.68 MG/DL — SIGNIFICANT CHANGE UP (ref 0.5–1.3)
EGFR: 95 ML/MIN/1.73M2 — SIGNIFICANT CHANGE UP
EGFR: 98 ML/MIN/1.73M2 — SIGNIFICANT CHANGE UP
EOSINOPHIL # BLD AUTO: 0.19 K/UL — SIGNIFICANT CHANGE UP (ref 0–0.5)
EOSINOPHIL NFR BLD AUTO: 2.8 % — SIGNIFICANT CHANGE UP (ref 0–6)
GAS PNL BLDV: 136 MMOL/L — SIGNIFICANT CHANGE UP (ref 136–145)
GAS PNL BLDV: 138 MMOL/L — SIGNIFICANT CHANGE UP (ref 136–145)
GAS PNL BLDV: SIGNIFICANT CHANGE UP
GLUCOSE BLDV-MCNC: 124 MG/DL — HIGH (ref 70–99)
GLUCOSE BLDV-MCNC: 143 MG/DL — HIGH (ref 70–99)
GLUCOSE SERPL-MCNC: 133 MG/DL — HIGH (ref 70–99)
GLUCOSE SERPL-MCNC: 157 MG/DL — HIGH (ref 70–99)
HCO3 BLDV-SCNC: 28 MMOL/L — SIGNIFICANT CHANGE UP (ref 22–29)
HCO3 BLDV-SCNC: 29 MMOL/L — SIGNIFICANT CHANGE UP (ref 22–29)
HCT VFR BLD CALC: 39.6 % — SIGNIFICANT CHANGE UP (ref 34.5–45)
HCT VFR BLDA CALC: 38 % — SIGNIFICANT CHANGE UP (ref 34.5–46.5)
HCT VFR BLDA CALC: 39 % — SIGNIFICANT CHANGE UP (ref 34.5–46.5)
HGB BLD CALC-MCNC: 12.6 G/DL — SIGNIFICANT CHANGE UP (ref 11.7–16.1)
HGB BLD CALC-MCNC: 12.9 G/DL — SIGNIFICANT CHANGE UP (ref 11.7–16.1)
HGB BLD-MCNC: 12.7 G/DL — SIGNIFICANT CHANGE UP (ref 11.5–15.5)
IMM GRANULOCYTES NFR BLD AUTO: 0.4 % — SIGNIFICANT CHANGE UP (ref 0–0.9)
LACTATE BLDV-MCNC: 1.1 MMOL/L — SIGNIFICANT CHANGE UP (ref 0.5–2)
LACTATE BLDV-MCNC: 1.2 MMOL/L — SIGNIFICANT CHANGE UP (ref 0.5–2)
LYMPHOCYTES # BLD AUTO: 1.89 K/UL — SIGNIFICANT CHANGE UP (ref 1–3.3)
LYMPHOCYTES # BLD AUTO: 27.8 % — SIGNIFICANT CHANGE UP (ref 13–44)
MAGNESIUM SERPL-MCNC: 2.2 MG/DL — SIGNIFICANT CHANGE UP (ref 1.6–2.6)
MCHC RBC-ENTMCNC: 31.1 PG — SIGNIFICANT CHANGE UP (ref 27–34)
MCHC RBC-ENTMCNC: 32.1 GM/DL — SIGNIFICANT CHANGE UP (ref 32–36)
MCV RBC AUTO: 96.8 FL — SIGNIFICANT CHANGE UP (ref 80–100)
MONOCYTES # BLD AUTO: 0.48 K/UL — SIGNIFICANT CHANGE UP (ref 0–0.9)
MONOCYTES NFR BLD AUTO: 7.1 % — SIGNIFICANT CHANGE UP (ref 2–14)
NEUTROPHILS # BLD AUTO: 4.13 K/UL — SIGNIFICANT CHANGE UP (ref 1.8–7.4)
NEUTROPHILS NFR BLD AUTO: 60.9 % — SIGNIFICANT CHANGE UP (ref 43–77)
NRBC # BLD: 0 /100 WBCS — SIGNIFICANT CHANGE UP (ref 0–0)
PCO2 BLDV: 45 MMHG — HIGH (ref 39–42)
PCO2 BLDV: 49 MMHG — HIGH (ref 39–42)
PH BLDV: 7.38 — SIGNIFICANT CHANGE UP (ref 7.32–7.43)
PH BLDV: 7.4 — SIGNIFICANT CHANGE UP (ref 7.32–7.43)
PLATELET # BLD AUTO: 233 K/UL — SIGNIFICANT CHANGE UP (ref 150–400)
PO2 BLDV: 53 MMHG — HIGH (ref 25–45)
PO2 BLDV: 75 MMHG — HIGH (ref 25–45)
POTASSIUM BLDV-SCNC: 4.1 MMOL/L — SIGNIFICANT CHANGE UP (ref 3.5–5.1)
POTASSIUM BLDV-SCNC: 5.7 MMOL/L — HIGH (ref 3.5–5.1)
POTASSIUM SERPL-MCNC: 4.2 MMOL/L — SIGNIFICANT CHANGE UP (ref 3.5–5.3)
POTASSIUM SERPL-MCNC: 6.3 MMOL/L — CRITICAL HIGH (ref 3.5–5.3)
POTASSIUM SERPL-SCNC: 4.2 MMOL/L — SIGNIFICANT CHANGE UP (ref 3.5–5.3)
POTASSIUM SERPL-SCNC: 6.3 MMOL/L — CRITICAL HIGH (ref 3.5–5.3)
PROT SERPL-MCNC: 8 G/DL — SIGNIFICANT CHANGE UP (ref 6–8.3)
RBC # BLD: 4.09 M/UL — SIGNIFICANT CHANGE UP (ref 3.8–5.2)
RBC # FLD: 13.4 % — SIGNIFICANT CHANGE UP (ref 10.3–14.5)
SAO2 % BLDV: 83.2 % — SIGNIFICANT CHANGE UP (ref 67–88)
SAO2 % BLDV: 96.2 % — HIGH (ref 67–88)
SODIUM SERPL-SCNC: 139 MMOL/L — SIGNIFICANT CHANGE UP (ref 135–145)
SODIUM SERPL-SCNC: 141 MMOL/L — SIGNIFICANT CHANGE UP (ref 135–145)
TROPONIN T, HIGH SENSITIVITY RESULT: <6 NG/L — SIGNIFICANT CHANGE UP (ref 0–51)
WBC # BLD: 6.79 K/UL — SIGNIFICANT CHANGE UP (ref 3.8–10.5)
WBC # FLD AUTO: 6.79 K/UL — SIGNIFICANT CHANGE UP (ref 3.8–10.5)

## 2024-05-05 PROCEDURE — 99285 EMERGENCY DEPT VISIT HI MDM: CPT | Mod: 25

## 2024-05-05 PROCEDURE — 70491 CT SOFT TISSUE NECK W/DYE: CPT | Mod: MC

## 2024-05-05 PROCEDURE — 80053 COMPREHEN METABOLIC PANEL: CPT

## 2024-05-05 PROCEDURE — 84484 ASSAY OF TROPONIN QUANT: CPT

## 2024-05-05 PROCEDURE — 85018 HEMOGLOBIN: CPT

## 2024-05-05 PROCEDURE — 85014 HEMATOCRIT: CPT

## 2024-05-05 PROCEDURE — 85025 COMPLETE CBC W/AUTO DIFF WBC: CPT

## 2024-05-05 PROCEDURE — 84132 ASSAY OF SERUM POTASSIUM: CPT

## 2024-05-05 PROCEDURE — 36415 COLL VENOUS BLD VENIPUNCTURE: CPT

## 2024-05-05 PROCEDURE — 80048 BASIC METABOLIC PNL TOTAL CA: CPT

## 2024-05-05 PROCEDURE — 99285 EMERGENCY DEPT VISIT HI MDM: CPT

## 2024-05-05 PROCEDURE — 70491 CT SOFT TISSUE NECK W/DYE: CPT | Mod: 26,MC

## 2024-05-05 PROCEDURE — 71275 CT ANGIOGRAPHY CHEST: CPT | Mod: 26,MC

## 2024-05-05 PROCEDURE — 82803 BLOOD GASES ANY COMBINATION: CPT

## 2024-05-05 PROCEDURE — 93005 ELECTROCARDIOGRAM TRACING: CPT

## 2024-05-05 PROCEDURE — 36000 PLACE NEEDLE IN VEIN: CPT | Mod: XU

## 2024-05-05 PROCEDURE — 83735 ASSAY OF MAGNESIUM: CPT

## 2024-05-05 PROCEDURE — 82947 ASSAY GLUCOSE BLOOD QUANT: CPT

## 2024-05-05 PROCEDURE — 82330 ASSAY OF CALCIUM: CPT

## 2024-05-05 PROCEDURE — 71275 CT ANGIOGRAPHY CHEST: CPT | Mod: MC

## 2024-05-05 PROCEDURE — 83605 ASSAY OF LACTIC ACID: CPT

## 2024-05-05 PROCEDURE — 71045 X-RAY EXAM CHEST 1 VIEW: CPT | Mod: 26

## 2024-05-05 PROCEDURE — 71045 X-RAY EXAM CHEST 1 VIEW: CPT

## 2024-05-05 PROCEDURE — 84295 ASSAY OF SERUM SODIUM: CPT

## 2024-05-05 PROCEDURE — 82435 ASSAY OF BLOOD CHLORIDE: CPT

## 2024-05-05 NOTE — ED ADULT NURSE NOTE - OBJECTIVE STATEMENT
68 yo presents to the ED from home. A&Ox4, ambulatory with family at bedside translating for pt c/o shortness of breath and cough and worsening throat tightness and left neck pain. 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 Patient subsequently followed up since then with ENT and had outpatient MRI which shows the mass increased in size to 4.5 cm. Patient is yet to have a biopsy. Patient now having nonproductive cough, shortness of breath, worsening throat tightness and chest pain worse after she coughs. Patient had fever last week but no longer has fever denies vomiting, abdominal pain, dysuria, diarrhea. 20G inserted L thumb. Patient undressed and placed into gown, call bell in hand and side rails up for safety. warm blanket provided, vital signs stable, pt in no acute distress.

## 2024-05-05 NOTE — ED PROVIDER NOTE - ATTENDING CONTRIBUTION TO CARE
I, Robert Live MD, Emergency Medicine Attending Physician, personally saw and examined the patient and I personally made/approve the management plan and take responsibility for the patient management.    MDM: 69-year-old female with history of hypertension, hyperlipidemia, GERD, hiatal hernia, who presents with cough, shortness of breath, chest pain, and increasing size of mass with associated worsening of throat tightness and hoarse voice and left-sided neck pain.  Also has been having small amount of blood in the saliva.  Has been having fever up to 102F, has been taking azithromycin.    Patient recently seen in [place] ED on 4/12/2024 for dizziness, headache and elevated blood pressure.  Patient had CT head and CTA head and neck.   Reviewed CT results from OSH: CTA neck showed approximately 2.4 cm soft tissue mass along the left oropharynx.    Reviewed MRI of neck with and without contrast performed on 4/27/2024 at [facility] that showed "approximately 4.5 x 2.1 x 1.5 cm mass in the left lateral oropharyngeal wall likely representing a carcinoma.  Borderline up to 6 mm bilateral lateral retropharyngeal enhancing lymph nodes.  Although nonspecific by size criteria metastatic lymphadenopathy is possible.  Symmetric thickening of the nasopharyngeal soft tissues likely representing lymphoid hyperplasia."    On examination, patient with stable vitals, no acute distress. Cardiac examination RRR, lungs CTAB with no W/W/R.  Abdomen is soft and nontender, nondistended.  Neurovascularly intact in all 4 extremities.    A/P: Will obtain labs to evaluate for hematologic disorder, metabolic derangements, hepatic and renal function, and screen for infection. Will keep patient on cardiac monitor, obtain EKG and troponin to evaluate for possible cardiac abnormality including ACS and arrhythmia.  Will obtain chest x-ray to evaluate for acute cardiopulmonary pathology.  Will obtain CTA of chest for PE given patient with chest pain, shortness of breath and scant hemoptysis.  Will obtain soft tissue neck CT to evaluate for oropharyngeal mass.    My independent interpretation of the EKG shows:  Normal sinus rhythm with rate of 87 bpm, normal axis, no ST elevations or depressions.  Mild T wave flattening in lead III and T wave inversion in V3    Signed out at 3 PM to Dr. Sid Stone pending imaging, consultation recommendations, and close reassessments for further treatment and disposition decisions.

## 2024-05-05 NOTE — ED PROVIDER NOTE - OBJECTIVE STATEMENT
69-year-old female past medical history of hypertension, hyperlipidemia presents ED complaining of several days of shortness of breath and cough and worsening throat tightness and left neck pain.  Patient was in the ED 23 days ago for sudden onset dizziness at [place] had stroke workup included CT angio head and neck which showed incidentally a 2.4 cm left oropharyngeal mass.  Patient subsequently followed up since then with ENT and had outpatient MRI which shows the mass increased in size to 4.5 cm.  Patient is yet to have a biopsy.  Patient now having nonproductive cough, shortness of breath, worsening throat tightness and chest pain worse after she coughs.  Patient had fever last week but no longer has fever denies vomiting, abdominal pain, dysuria, diarrhea.    Patient is primarily Mandarin speaking, patient's son is bedside providing translation as per patient request.

## 2024-05-05 NOTE — ED ADULT NURSE NOTE - NSFALLUNIVINTERV_ED_ALL_ED
Bed/Stretcher in lowest position, wheels locked, appropriate side rails in place/Call bell, personal items and telephone in reach/Instruct patient to call for assistance before getting out of bed/chair/stretcher/Non-slip footwear applied when patient is off stretcher/[place] to call system/Physically safe environment - no spills, clutter or unnecessary equipment/Purposeful proactive rounding/Room/bathroom lighting operational, light cord in reach

## 2024-05-05 NOTE — ED PROVIDER NOTE - CLINICAL SUMMARY MEDICAL DECISION MAKING FREE TEXT BOX
Raúl Sanders,  PGY-3: 69-year-old female past medical history of hypertension, hyperlipidemia presents ED complaining of several days of shortness of breath and cough and worsening throat tightness and left neck pain.  Patient was in the ED 23 days ago for sudden onset dizziness at [place] had stroke workup included CT angio head and neck which showed incidentally a 2.4 cm left oropharyngeal mass.  Patient subsequently followed up since then with ENT and had outpatient MRI which shows the mass increased in size to 4.5 cm.  Patient is yet to have a biopsy.  Patient now having nonproductive cough, shortness of breath, worsening throat tightness and chest pain worse after she coughs.  Patient had fever last week but no longer has fever denies vomiting, abdominal pain, dysuria, diarrhea. Patient on exam uncomfortable appearing but hemodynamically stable, not hypoxic, oropharynx with posterior tonsillar edema on the left, left neck fullness adjacent to the trachea, trachea midline, otherwise nonfocal.  Patient did endorse an episode of hemoptysis recently.  Differential includes not limited to pneumonia, worsening of underlying mass, PTA/RPA, PE with likely cancer present, low suspicion for ACS.  Plan for labs, CT neck and CTA chest, EKG, telemetry.  Reassess

## 2024-05-05 NOTE — ED PROVIDER NOTE - PATIENT PORTAL LINK FT
You can access the FollowMyHealth Patient Portal offered by [facility] by registering at the following website: [email or web address]. By joining [facility] [employer]’s FollowMyHealth portal, you will also be able to view your health information using other applications (apps) compatible with our system.

## 2024-05-05 NOTE — ED PROVIDER NOTE - NSFOLLOWUPINSTRUCTIONS_ED_ALL_ED_FT
1.  Please follow-up with otolaryngology/ENT, please call for an appointment to phone numbers provided.  2.  Please follow-up rapidly as there is concern for metastatic cancer you require biopsy.  3.  Please return to the ER if you develop worsening shortness of breath, throat tightness, chest pain or anything of concern.

## 2024-05-05 NOTE — ED PROVIDER NOTE - PHYSICAL EXAMINATION
GEN: Patient awake alert NAD.   HEENT: normocephalic, atraumatic, Left posterior tonsillar edema, left neck fullness without tenderness the superior aspect adjacent to the trachea, uvula midline, no tongue edema or elevation, no tonsillar exudates or erythema, EOMI, no scleral icterus, moist MM  CARDIAC: RRR, S1, S2, no murmur.   PULM: CTA B/L no wheeze, rhonchi, rales.   ABD: soft NT, ND, no rebound no guarding,  MSK: Moving all extremities, no edema.   NEURO: A&Ox3, no focal neurological deficits  SKIN: warm, dry, no rash.

## 2024-05-05 NOTE — ED PROVIDER NOTE - NSFOLLOWUPCLINICS_GEN_ALL_ED_FT
[facility] - ENT  Otolaryngology (ENT)  [address] [place] [address]  Phone: [phone]  Fax:   Follow Up Time: Urgent

## 2024-05-05 NOTE — ED PROVIDER NOTE - PROGRESS NOTE DETAILS
Raúl Sanders, DO PGY-3: ENT scoped patient, no concern for airway compromise at this time.  They will expedite her follow-up appointment with the ENT clinic by KEYLA.  Patient is agreeable with this plan.  Will discharge patient to follow-up rapidly with ENT for biopsy. Patient CT shows 3.5 cm mass, questionable metastatic lesion in the lung, labs nonactionable.

## 2024-05-06 ENCOUNTER — APPOINTMENT (OUTPATIENT)
Dept: OTOLARYNGOLOGY | Facility: CLINIC | Age: 69
End: 2024-05-06
Payer: MEDICARE

## 2024-05-06 VITALS
HEIGHT: 59 IN | OXYGEN SATURATION: 98 % | DIASTOLIC BLOOD PRESSURE: 75 MMHG | WEIGHT: 113 LBS | SYSTOLIC BLOOD PRESSURE: 117 MMHG | BODY MASS INDEX: 22.78 KG/M2

## 2024-05-06 DIAGNOSIS — J35.8 OTHER CHRONIC DISEASES OF TONSILS AND ADENOIDS: ICD-10-CM

## 2024-05-06 DIAGNOSIS — Z80.7 FAMILY HISTORY OF OTHER MALIGNANT NEOPLASMS OF LYMPHOID, HEMATOPOIETIC AND RELATED TISSUES: ICD-10-CM

## 2024-05-06 DIAGNOSIS — R22.1 LOCALIZED SWELLING, MASS AND LUMP, NECK: ICD-10-CM

## 2024-05-06 PROCEDURE — 31575 DIAGNOSTIC LARYNGOSCOPY: CPT

## 2024-05-06 PROCEDURE — 31525 DX LARYNGOSCOPY EXCL NB: CPT

## 2024-05-06 PROCEDURE — 99204 OFFICE O/P NEW MOD 45 MIN: CPT | Mod: 25

## 2024-05-06 RX ORDER — SIMETHICONE 40MG/0.6ML
400-400-40 SUSPENSION, DROPS(FINAL DOSAGE FORM)(ML) ORAL TWICE DAILY
Qty: 1 | Refills: 1 | Status: DISCONTINUED | COMMUNITY
Start: 2021-08-10 | End: 2024-05-06

## 2024-05-06 RX ORDER — SULFAMETHOXAZOLE AND TRIMETHOPRIM 800; 160 MG/1; MG/1
800-160 TABLET ORAL TWICE DAILY
Qty: 14 | Refills: 0 | Status: DISCONTINUED | COMMUNITY
Start: 2024-05-06 | End: 2024-05-06

## 2024-05-06 NOTE — CONSULT NOTE ADULT - SUBJECTIVE AND OBJECTIVE BOX
CC: tonsil mass     HPI: 69-year-old female past medical history of hypertension, hyperlipidemia presents ED complaining of several days of shortness of breath and cough and worsening throat tightness and left neck pain.  Patient was in the ED 23 days ago for sudden onset dizziness at [place] had stroke workup included CT angio head and neck which showed incidentally a 2.4 cm left oropharyngeal mass.  Patient subsequently followed up since then with ENT and had outpatient MRI which shows the mass increased in size to 4.5 cm.  Patient is yet to have a biopsy.  Patient now having nonproductive cough, shortness of breath, worsening throat tightness and chest pain worse after she coughs.  Patient had fever last week but no longer has fever denies vomiting, abdominal pain, dysuria, diarrhea.    	Patient is primarily Mandarin speaking, patient's son is bedside providing translation as per patient request.      PAST MEDICAL & SURGICAL HISTORY:  GERD (gastroesophageal reflux disease)      Hiatal hernia      HLD (hyperlipidemia)      HTN (hypertension)      Kidney stones      Osteoporosis      History of tachycardia      H/O prior ablation treatment      Pregnancy w/ hx of uterine myomectomy        Allergies    penicillin (Rash (Moderate))    Intolerances      MEDICATIONS  (STANDING):    MEDICATIONS  (PRN):      Social History: no pertinent social history     Family history: no pertinent family history     ROS:   ENT: all negative except as noted in HPI   CV: denies palpitations  Pulm: see hpi   GI: denies change in appetite, indigestion, n/v  : denies pertinent urinary symptoms, urgency  Neuro: denies numbness/tingling, loss of sensation  Psych: denies anxiety  MS: denies muscle weakness, instability  Heme: denies easy bruising or bleeding  Endo: denies heat/cold intolerance, excessive sweating  Vascular: denies LE edema    Vital Signs Last 24 Hrs  T(C): 36.8 (05 May 2024 22:59), Max: 36.8 (05 May 2024 13:15)  T(F): 98.3 (05 May 2024 22:59), Max: 98.3 (05 May 2024 20:32)  HR: 77 (05 May 2024 22:59) (61 - 77)  BP: 115/76 (05 May 2024 22:59) (115/76 - 125/72)  BP(mean): --  RR: 18 (05 May 2024 22:59) (17 - 18)  SpO2: 98% (05 May 2024 22:59) (97% - 98%)    Parameters below as of 05 May 2024 22:59  Patient On (Oxygen Delivery Method): room air                              12.7   6.79  )-----------( 233      ( 05 May 2024 14:39 )             39.6    05-05    141  |  105  |  12  ----------------------------<  133<H>  4.2   |  25  |  0.68    Ca    9.3      05 May 2024 15:39  Mg     2.2     05-05    TPro  8.0  /  Alb  4.0  /  TBili  0.3  /  DBili  x   /  AST  46<H>  /  ALT  22  /  AlkPhos  46  05-05       PHYSICAL EXAM:  Gen: NAD  Skin: No rashes, bruises, or lesions  Head: Normocephalic, Atraumatic  Face: no edema, erythema, or fluctuance. Parotid glands soft without mass  Eyes: no scleral injection  Nose: Nares bilaterally patent, no discharge  Mouth: +redundant tissue left tonsil. No Stridor / Drooling / Trismus.  Mucosa moist, tongue/uvula midline.   Neck: Flat, supple, no lymphadenopathy, trachea midline, no masses  Lymphatic: No lymphadenopathy  Resp: breathing easily, no stridor  CV: no peripheral edema/cyanosis  GI: nondistended   Peripheral vascular: no JVD or edema  Neuro: facial nerve intact, no facial droop        Fiberoptic Indirect laryngoscopy:  (Scope #2 used)  Reason for Laryngoscopy: left tonsil mass     Patient was unable to cooperate with mirror.  Nasopharynx, oropharynx, and hypopharynx clear, no bleeding. Tongue base, posterior pharyngeal wall, vallecula, epiglottis, and subglottis appear normal. No erythema, edema, pooling of secretions, masses or lesions. Airway patent, no foreign body visualized. No glottic/supraglottic edema. True vocal cords, arytenoids, vestibular folds, ventricles, pyriform sinuses, and aryepiglottic folds appear normal bilaterally. Vocal cords mobile with good contact b/l.        IMAGING/ADDITIONAL STUDIES: CT NECK  IMPRESSION:    Left tonsillar fossa mass up to 3.5 cm in size, increased from 4/12/24 though comparison is limited. Tissue sampling advised, with lymphoma or squamous cell carcinoma both considered.    No additional mass or airway compromise.    Mildly enlarged left level 2 node is suspicious for regional metastasis and can be further assessed on PET-CT or biopsy as needed.    --- End of Report ---

## 2024-05-06 NOTE — CONSULT NOTE ADULT - PROBLEM SELECTOR RECOMMENDATION 9
- Discussed w/ ENT Head & Neck team at [facility] for possible transfer, advised to f/u in office for biopsy as long as airway is clear   - Will expedite appointment to be seen ASAP   - [facility] Head and Neck: Dr. Villalpando, Dr. Bowman, Dr. Vines, Dr. Nunez, call [phone]

## 2024-05-06 NOTE — CONSULT NOTE ADULT - ASSESSMENT
69-year-old female past medical history of hypertension, hyperlipidemia presents ED complaining of several days of shortness of breath and cough and worsening throat tightness and left neck pain. Incidental finding of a 2.4 cm left oropharyngeal mass during ED visit on 4/12. Patient subsequently followed up since then with ENT and had outpatient MRI which shows the mass increased in size to 4.5 cm. Pt has not had biopsy yet. Repeat CT performed today which shows "Left tonsillar fossa mass up to 3.5 cm in size, increased from 4/12/24 though comparison is limited." "Mildly enlarged left level 2 node is suspicious for regional metastasis." Indirect laryngoscopy performed, airway is widely patent. No stridor on exam, pt saturating well on room air.

## 2024-05-07 ENCOUNTER — NON-APPOINTMENT (OUTPATIENT)
Age: 69
End: 2024-05-07

## 2024-05-08 ENCOUNTER — OUTPATIENT (OUTPATIENT)
Dept: OUTPATIENT SERVICES | Facility: HOSPITAL | Age: 69
LOS: 1 days | End: 2024-05-08
Payer: MEDICARE

## 2024-05-08 ENCOUNTER — OUTPATIENT (OUTPATIENT)
Dept: OUTPATIENT SERVICES | Facility: HOSPITAL | Age: 69
LOS: 1 days | End: 2024-05-08

## 2024-05-08 ENCOUNTER — APPOINTMENT (OUTPATIENT)
Dept: NUCLEAR MEDICINE | Facility: IMAGING CENTER | Age: 69
End: 2024-05-08
Payer: MEDICARE

## 2024-05-08 ENCOUNTER — APPOINTMENT (OUTPATIENT)
Dept: NUCLEAR MEDICINE | Facility: IMAGING CENTER | Age: 69
End: 2024-05-08

## 2024-05-08 VITALS
OXYGEN SATURATION: 96 % | HEART RATE: 72 BPM | HEIGHT: 59.5 IN | WEIGHT: 108.03 LBS | RESPIRATION RATE: 16 BRPM | DIASTOLIC BLOOD PRESSURE: 66 MMHG | SYSTOLIC BLOOD PRESSURE: 106 MMHG | TEMPERATURE: 98 F

## 2024-05-08 DIAGNOSIS — R22.1 LOCALIZED SWELLING, MASS AND LUMP, NECK: ICD-10-CM

## 2024-05-08 DIAGNOSIS — J35.8 OTHER CHRONIC DISEASES OF TONSILS AND ADENOIDS: ICD-10-CM

## 2024-05-08 DIAGNOSIS — I10 ESSENTIAL (PRIMARY) HYPERTENSION: ICD-10-CM

## 2024-05-08 DIAGNOSIS — Z98.890 OTHER SPECIFIED POSTPROCEDURAL STATES: Chronic | ICD-10-CM

## 2024-05-08 DIAGNOSIS — O34.29 MATERNAL CARE DUE TO UTERINE SCAR FROM OTHER PREVIOUS SURGERY: Chronic | ICD-10-CM

## 2024-05-08 PROCEDURE — 78815 PET IMAGE W/CT SKULL-THIGH: CPT | Mod: 26,PI

## 2024-05-08 PROCEDURE — 78815 PET IMAGE W/CT SKULL-THIGH: CPT

## 2024-05-08 PROCEDURE — A9552: CPT

## 2024-05-08 RX ORDER — METOPROLOL TARTRATE 50 MG
0.5 TABLET ORAL
Refills: 0 | DISCHARGE

## 2024-05-08 RX ORDER — CHOLECALCIFEROL (VITAMIN D3) 125 MCG
1 CAPSULE ORAL
Qty: 0 | Refills: 0 | DISCHARGE

## 2024-05-08 RX ORDER — SODIUM CHLORIDE 9 MG/ML
1000 INJECTION, SOLUTION INTRAVENOUS
Refills: 0 | Status: DISCONTINUED | OUTPATIENT
Start: 2024-05-16 | End: 2024-05-30

## 2024-05-08 RX ORDER — MECLIZINE HCL 12.5 MG
1 TABLET ORAL
Refills: 0 | DISCHARGE

## 2024-05-08 NOTE — H&P PST ADULT - RESPIRATORY AND THORAX COMMENTS
Pt c/o difficulty swallowing food and liquids and a 5 lb weight loss over past month tonsil mass Pt c/o difficulty swallowing food and liquids and a 5 lb weight loss over past month. CT showed  tonsil mass

## 2024-05-08 NOTE — H&P PST ADULT - PROBLEM SELECTOR PLAN 2
-acute, likely dilutional  -?diuretic prescribed by PMD son to follow up which med rx, day team please follow up sons number above  -pt and son endorse drinking soley water in large amounts due to kidney stone, advised increasing fluid content in general not just wanted  -check urine na, urine osm, serum osm  -apprec nephro recs for proper correction  -monitor i/os  -bmp 1030am today, assure not overcorrection Patient instructed to take amlodipine, losartan, metoprolol with a sip of water on the morning of procedure.

## 2024-05-08 NOTE — H&P PST ADULT - NSICDXPASTMEDICALHX_GEN_ALL_CORE_FT
PAST MEDICAL HISTORY:  GERD (gastroesophageal reflux disease)     Hiatal hernia     History of tachycardia     HLD (hyperlipidemia)     HTN (hypertension)     Kidney stones     Osteoporosis      PAST MEDICAL HISTORY:  GERD (gastroesophageal reflux disease)     Hiatal hernia     History of tachycardia     HLD (hyperlipidemia)     HTN (hypertension)     Kidney stones     Osteoporosis     Other chronic diseases of tonsils and adenoids

## 2024-05-08 NOTE — HISTORY OF PRESENT ILLNESS
[FreeTextEntry1] : 4/15/24 - Patient reports elevated BP of 160/90, 190/110 and dizziness for two days and went to  [place] ER. Patient reports CP after meals. Patient denies SOB. Patient reports occasional palpitations.  She is on Amlodipine 5 mg, Losartan 100 mg, Metoprolol ER 25, mg for HTN.  She is on Atorvastatin 20 mg for HLD.  I advised patient to undergo an echocardiogram and a treadmill stress test.  Patient underwent an echocardiogram and it showed normal LV function without significant valvular pathology. Patient underwent a treadmill stress test and completed 6.5 minutes of Marcelino protocol.  There were upsloping ST depressions on ECG but no symptoms.  Following treadmill stress, there was no echocardiographic evidence of ischemia.   Her symptom may be GI in etiology.    7/5/22- Patient recently had CT and was found to have a 2.6 cm pericardial cyst. She was recommended to have MRI. Patient reports L sided CP that is tender to touch for the last 2 months. Her BP is elevated 159/92. Patient is on Amlodipine 5 mg, Atorvastatin 10 mg, Losartan 100 mg, Metoprolol ER 25 mg.  Patient denies SOB. Patient denies palpitations. She reports dizziness with exertion likely due to vertigo. I will obtain insurance authorization for MRI. I advised patient to wear Holter monitor, but she reports that she wore it at end of last year with PCP and it was probably normal.  Cardiac MRI showed benign pericardial cyst 2.2 cm.    8/10/21 - Patient reports that 2 nights ago she had lower CP after she woke up to use bathroom and felt better after drinking water. Patient reports belching and felt better.  I advised patient to undergo a treadmill stress test. Patient underwent a treadmill stress test and completed 10 minutes of Marcelino protocol.  There were upsloping ST depressions on ECG but no symptoms.  Following treadmill stress, there was no echocardiographic evidence of ischemia.   7/28/21 - Patient had recent episode of syncope when she had stomach ache using the bathroom. Patient hit her head and was unconscious with diaphoresis. Patient reports palpitations. Patient denies CP. Patient denies SOB.  Patient had an episode of vertigo with vomiting 1 month ago. Her BP was elevated at the time and she went to ER. She had head CT which was negative. She has been having headaches since. I advised patient to undergo an echocardiogram. I advised patient to wear a Holter monitor.  Patient underwent an echocardiogram and it showed normal LV function without significant valvular pathology.  Patient wore a Holter and it showed rare APC's, rare PVC's, with episodes of 2:1 AV block at night as well as type I second degree AV block.  Her syncope was likely vasovagal in etiology but she may need a PPM if she has recurrent syncope.  5/28/15 - Patient reports that for the past week she has been experiencing upper and substernal chest discomfort, described as tightness, not related to exertion or to meals, relieved with hiccups. She denies SOB with exertion. She denies palpitations ever since ablation. She denies h/o syncope.

## 2024-05-08 NOTE — H&P PST ADULT - HISTORY OF PRESENT ILLNESS
67yo mandarin speaking F with pmh HTN. HLD, recurrent nephrolithiasis for over 20 years,brought in by son for elevated blood pressure.  Patient has been diagnosed with a kidney stone 1 month ago and has been having pain from the kidney stone the son took her blood pressure tonight and found it to be elevated.  Patient also states that she has decreased power in her right left arm and left leg.  Son states that the symptoms started in the left and approximately 10 hours ago.  Symptoms have spread to involve the entire left arm and now the left leg patient describes no power but the son also states that she is describing a numbness.  Patient took oxycodone for the pain from the kidney stone.  No dizziness, no visual symptoms. At time of admission pt's symptoms were mostly resolved.     In the ED pt underwent multiple CT scans which were remarkable for R ICA 3mm aneurysm (discussed with son and recommended outpt follow up with cardio), L 7mm mid ureteral stone with mild hydronephrosis, non obstructing renal calculi.  CT head was unremarkable.    69 year old  Mandarin speaking female with hx of HTN. HLD, h/o tachycardia s/p ablation 20 years ago, recurrent nephrolithiasis presents to PST with pre op dx of other chronic diseases of tonsils and adenoids is scheduled for left tonsillectomy , direct laryngoscopy and biopsy.   Pt went to [facility] ER  05/24 for shortness of breath, 04/24  for sudden onset dizziness, stroke workup included CT angio head and neck which showed incidentally a 2.4 cm left oropharyngeal mass. She subsequently had out pt MRI which showed  the mass increased in size to 4.5 cm. Pt also underwent multiple CT scans in the ER  which were remarkable for R ICA 3mm aneurysm . CT head was unremarkable.

## 2024-05-08 NOTE — REASON FOR VISIT
[FreeTextEntry1] : 68 year-old female with HTN, hyperlipidemia, h/o tachycardia s/p ablation, hiatal hernia, hyperglycemia, osteoporosis, presents for followup.   Patient was last seen on 7/5/22- Patient recently had CT and was found to have a 2.6 cm pericardial cyst. She was recommended to have MRI. Patient reports L sided CP that is tender to touch for the last 2 months. Her BP is elevated 159/92. Patient is on Amlodipine 5 mg, Atorvastatin 10 mg, Losartan 100 mg, Metoprolol ER 25 mg.  Patient denies SOB. Patient denies palpitations. She reports dizziness with exertion likely due to vertigo. I will obtain insurance authorization for MRI. I advised patient to wear Holter monitor, but she reports that she wore it at end of last year with PCP and it was probably normal.  Cardiac MRI showed benign pericardial cyst 2.2 cm.    Patient underwent a treadmill stress test 8/10/21 and completed 10 minutes of Marcelino protocol.  There were upsloping ST depressions on ECG but no symptoms.  Following treadmill stress, there was no echocardiographic evidence of ischemia.   Patient underwent an echocardiogram 7/30/21 and it showed normal LV function without significant valvular pathology.    Patient wore a Holter 7/28/21 and it showed rare APC's, rare PVC's, with episodes of 2:1 AV block at night as well as type I second degree AV block.  I advised patient that she may need a PPM if she has recurrent syncope.

## 2024-05-08 NOTE — H&P PST ADULT - PROBLEM SELECTOR PLAN 6
R ICA 3mm aneurysm noted on CTA neck  also CT renal hunt demonstrated incidentally Coronary  dilatation. 3.0 x 2.3 cm right cardiophrenic pericardial cyst, mildly  increased in size since prior exam. Small hiatal hernia.  son reinforced outpatient follow up with cardio and recommended to download follow [facility] for reports  will consider cardio consult dr mcfarland

## 2024-05-08 NOTE — H&P PST ADULT - CARDIOVASCULAR COMMENTS
hx of R ICA 3mm aneurysm , CT renal hunt demonstrated incidentally coronary dilatation. 3.0 x 2.3 cm right cardiophrenic pericardial cyst, mildly  increased in size since prior exam- pt follows cardiologist hx of R ICA 3mm aneurysm , CT renal hunt demonstrated incidentally coronary dilatation, cm right cardiophrenic pericardial cyst, mildly  increased in size since prior exam- pt follows cardiologist, Cardiac MRI showed benign pericardial cyst

## 2024-05-08 NOTE — H&P PST ADULT - PROBLEM SELECTOR PLAN 1
-acute sec to pain and elevated BP, suspected  -check a1c, lipid panel-if elevated consider adjusting statin to mod intensity  -PT eval  -check echo  -consider neuro consult   -neuro checks  -bp control  -will start 81mg asa Patient tentatively scheduled for left tonsillectomy , direct laryngoscopy and biopsy on 05/16/2024.  Pre-op instructions provided. Pt given verbal and written instructions with teach back on omeprazole. Pt verbalized understanding with return demonstration.  cbc, bmp done on 05/05/24 - copy of results

## 2024-05-08 NOTE — PHYSICAL EXAM
[Well Developed] : well developed [Well Nourished] : well nourished [No Acute Distress] : no acute distress [Normal Conjunctiva] : normal conjunctiva [Normal Venous Pressure] : normal venous pressure [No Carotid Bruit] : no carotid bruit [Normal S1, S2] : normal S1, S2 [No Rub] : no rub [No Gallop] : no gallop [Murmur] : murmur [Clear Lung Fields] : clear lung fields [Good Air Entry] : good air entry [No Respiratory Distress] : no respiratory distress  [Soft] : abdomen soft [Non Tender] : non-tender [No Masses/organomegaly] : no masses/organomegaly [Normal Bowel Sounds] : normal bowel sounds [Normal Gait] : normal gait [No Edema] : no edema [No Cyanosis] : no cyanosis [No Clubbing] : no clubbing [No Varicosities] : no varicosities [Moves all extremities] : moves all extremities [No Focal Deficits] : no focal deficits [Normal Speech] : normal speech [Alert and Oriented] : alert and oriented [Normal memory] : normal memory [de-identified] : 2/6 ANSHU at apex

## 2024-05-08 NOTE — H&P PST ADULT - LAST ECHOCARDIOGRAM
04/15/24 Left ventricular systolic function is normal with an ejection fraction visually estimated at 65 %.

## 2024-05-08 NOTE — H&P PST ADULT - SOURCE OF INFORMATION, PROFILE
daughters/patient/family daughter and daughter in law Jenna Flores [phone]- daughter in law , Meli Fierro [phone] -daughter/patient/family

## 2024-05-08 NOTE — H&P PST ADULT - PROBLEM SELECTOR PLAN 4
acute on chronic, recurrent  pt son advised follup with urology outpatient given recurrent history  UA negative   monitor i/os  monitor course  pain control, bm regimen  consider uro consult if symptoms persist or worsen

## 2024-05-08 NOTE — H&P PST ADULT - HISTORY OF COVID-19 VACCINATION
Updated Edy;    Meliza has given approval to start sitravatinib today while we wait for the nivolumab coverage to come through.     He has the dosing diary and instructions for fasting 2 hours before and 1 hour after, etc.     Digna Burton is also planning to call him today; I asked him to wait to take the dose until she has called, but to go ahead and take the dose if she hasn't called by about noon.     C1D15 PK day will be deferred to C2D1 due to COVID pandemic per administrative letters issues by Meliza.     Will add CT CAP to schedule in late Aug prior to the visit with me 9/1.    Susan Muller MD     
Yes

## 2024-05-08 NOTE — H&P PST ADULT - LAST STRESS TEST
04/15/24 Normal sinus rhythm at a rate of 63 bpm with no significant ST abnormalities. 04/15/24 Normal sinus rhythm at a rate of 63 bpm with no significant ST abnormalities. 1.0 mm upsloping ST depression.

## 2024-05-09 PROBLEM — J35.8 OTHER CHRONIC DISEASES OF TONSILS AND ADENOIDS: Chronic | Status: ACTIVE | Noted: 2024-05-08

## 2024-05-13 ENCOUNTER — APPOINTMENT (OUTPATIENT)
Dept: OTOLARYNGOLOGY | Facility: CLINIC | Age: 69
End: 2024-05-13
Payer: MEDICARE

## 2024-05-13 VITALS
BODY MASS INDEX: 22.78 KG/M2 | OXYGEN SATURATION: 98 % | SYSTOLIC BLOOD PRESSURE: 110 MMHG | WEIGHT: 113 LBS | HEIGHT: 59 IN | DIASTOLIC BLOOD PRESSURE: 73 MMHG | HEART RATE: 78 BPM

## 2024-05-13 PROCEDURE — 99214 OFFICE O/P EST MOD 30 MIN: CPT | Mod: 25

## 2024-05-13 PROCEDURE — 31575 DIAGNOSTIC LARYNGOSCOPY: CPT

## 2024-05-13 RX ORDER — AZITHROMYCIN 250 MG/1
250 TABLET, FILM COATED ORAL
Qty: 6 | Refills: 0 | Status: COMPLETED | COMMUNITY
Start: 2024-04-30

## 2024-05-13 RX ORDER — HYDROCORTISONE 1 %
12 CREAM (GRAM) TOPICAL
Qty: 400 | Refills: 0 | Status: ACTIVE | COMMUNITY
Start: 2024-04-18

## 2024-05-13 RX ORDER — SUVOREXANT 15 MG/1
15 TABLET, FILM COATED ORAL
Qty: 30 | Refills: 0 | Status: ACTIVE | COMMUNITY
Start: 2024-01-29

## 2024-05-13 RX ORDER — BUTALBITAL, ACETAMINOPHEN AND CAFFEINE 325; 50; 40 MG/1; MG/1; MG/1
50-325-40 TABLET ORAL
Qty: 30 | Refills: 0 | Status: ACTIVE | COMMUNITY
Start: 2024-01-29

## 2024-05-13 RX ORDER — RALOXIFENE HYDROCHLORIDE 60 MG/1
60 TABLET, FILM COATED ORAL
Qty: 90 | Refills: 0 | Status: ACTIVE | COMMUNITY
Start: 2023-12-08

## 2024-05-13 RX ORDER — ATORVASTATIN CALCIUM 20 MG/1
20 TABLET, FILM COATED ORAL
Qty: 90 | Refills: 0 | Status: ACTIVE | COMMUNITY
Start: 2024-04-04

## 2024-05-15 ENCOUNTER — TRANSCRIPTION ENCOUNTER (OUTPATIENT)
Age: 69
End: 2024-05-15

## 2024-05-15 NOTE — ASU PATIENT PROFILE, ADULT - NSICDXPASTMEDICALHX_GEN_ALL_CORE_FT
PAST MEDICAL HISTORY:  GERD (gastroesophageal reflux disease)     Hiatal hernia     History of tachycardia     HLD (hyperlipidemia)     HTN (hypertension)     Kidney stones     Osteoporosis     Other chronic diseases of tonsils and adenoids

## 2024-05-16 ENCOUNTER — OUTPATIENT (OUTPATIENT)
Dept: OUTPATIENT SERVICES | Facility: HOSPITAL | Age: 69
LOS: 1 days | Discharge: HOME | End: 2024-05-16
Payer: MEDICARE

## 2024-05-16 ENCOUNTER — RESULT REVIEW (OUTPATIENT)
Age: 69
End: 2024-05-16

## 2024-05-16 ENCOUNTER — TRANSCRIPTION ENCOUNTER (OUTPATIENT)
Age: 69
End: 2024-05-16

## 2024-05-16 ENCOUNTER — APPOINTMENT (OUTPATIENT)
Dept: OTOLARYNGOLOGY | Facility: HOSPITAL | Age: 69
End: 2024-05-16
Payer: MEDICARE

## 2024-05-16 VITALS
HEART RATE: 74 BPM | TEMPERATURE: 98 F | WEIGHT: 108.03 LBS | HEIGHT: 59.5 IN | OXYGEN SATURATION: 99 % | RESPIRATION RATE: 16 BRPM | SYSTOLIC BLOOD PRESSURE: 103 MMHG | DIASTOLIC BLOOD PRESSURE: 65 MMHG

## 2024-05-16 VITALS
HEART RATE: 79 BPM | TEMPERATURE: 98 F | SYSTOLIC BLOOD PRESSURE: 108 MMHG | RESPIRATION RATE: 15 BRPM | DIASTOLIC BLOOD PRESSURE: 71 MMHG | OXYGEN SATURATION: 97 %

## 2024-05-16 DIAGNOSIS — O34.29 MATERNAL CARE DUE TO UTERINE SCAR FROM OTHER PREVIOUS SURGERY: Chronic | ICD-10-CM

## 2024-05-16 DIAGNOSIS — Z98.890 OTHER SPECIFIED POSTPROCEDURAL STATES: Chronic | ICD-10-CM

## 2024-05-16 DIAGNOSIS — J35.8 OTHER CHRONIC DISEASES OF TONSILS AND ADENOIDS: ICD-10-CM

## 2024-05-16 PROCEDURE — 88291 CYTO/MOLECULAR REPORT: CPT

## 2024-05-16 PROCEDURE — 88307 TISSUE EXAM BY PATHOLOGIST: CPT | Mod: 26

## 2024-05-16 PROCEDURE — 88189 FLOWCYTOMETRY/READ 16 & >: CPT

## 2024-05-16 PROCEDURE — ZZZZZ: CPT

## 2024-05-16 PROCEDURE — 42826 REMOVAL OF TONSILS: CPT | Mod: 59,GC

## 2024-05-16 PROCEDURE — 31525 DX LARYNGOSCOPY EXCL NB: CPT | Mod: 59,GC

## 2024-05-16 RX ORDER — METOPROLOL TARTRATE 50 MG
1 TABLET ORAL
Refills: 0 | DISCHARGE

## 2024-05-16 RX ORDER — OXYCODONE HYDROCHLORIDE 5 MG/1
5 TABLET ORAL
Qty: 280 | Refills: 0
Start: 2024-05-16 | End: 2024-05-29

## 2024-05-16 RX ORDER — OMEPRAZOLE 10 MG/1
1 CAPSULE, DELAYED RELEASE ORAL
Refills: 0 | DISCHARGE

## 2024-05-16 RX ORDER — AMLODIPINE BESYLATE 2.5 MG/1
1 TABLET ORAL
Refills: 0 | DISCHARGE

## 2024-05-16 RX ORDER — LOSARTAN POTASSIUM 100 MG/1
1 TABLET, FILM COATED ORAL
Qty: 0 | Refills: 0 | DISCHARGE

## 2024-05-16 RX ORDER — ATORVASTATIN CALCIUM 80 MG/1
1 TABLET, FILM COATED ORAL
Qty: 0 | Refills: 0 | DISCHARGE

## 2024-05-16 RX ORDER — OXYCODONE HYDROCHLORIDE 5 MG/1
5 TABLET ORAL EVERY 6 HOURS
Refills: 0 | Status: DISCONTINUED | OUTPATIENT
Start: 2024-05-16 | End: 2024-05-16

## 2024-05-16 RX ADMIN — SODIUM CHLORIDE 30 MILLILITER(S): 9 INJECTION, SOLUTION INTRAVENOUS at 09:40

## 2024-05-16 NOTE — BRIEF OPERATIVE NOTE - NSICDXBRIEFPROCEDURE_GEN_ALL_CORE_FT
PROCEDURES:  Tonsillectomy, adult 16-May-2024 09:18:41  Reema Sharp  Laryngoscopy, direct, adult 16-May-2024 09:19:19  Reema Sharp

## 2024-05-16 NOTE — ASU DISCHARGE PLAN (ADULT/PEDIATRIC) - NURSING INSTRUCTIONS
You received IV Tylenol for pain management at 9:00AM. Please DO NOT take any Tylenol (Acetaminophen) containing products, such as Vicodin, Percocet, Excedrin, and cold medications for the next 6 hours (until 3:00PM). DO NOT TAKE MORE THAN 4000 MG OF TYLENOL in a 24 hour period.

## 2024-05-16 NOTE — ASU DISCHARGE PLAN (ADULT/PEDIATRIC) - NS MD DC FALL RISK RISK
For information on Fall & Injury Prevention, visit: [email or web address] OR  [email or web address] OR  https://www.cdc.gov/steadi/patient.html

## 2024-05-16 NOTE — REASON FOR VISIT
[Patient Declined  Services] : - None: Patient declined  services [FreeTextEntry1] : Neck pain  [Interpreters_Relationshiptopatient] : Daughter [FreeTextEntry3] : Mandarin Patient declined offer of translation service. Patient preferred to use accompanying family member/friend for translation.  [TWNoteComboBox1] : Other

## 2024-05-16 NOTE — ASSESSMENT
[FreeTextEntry1] : 69 year old female presents for evaluation of tonsil mass found on CT, Left tonsillar fossa mass up to 3.5 cm in size  -Reviewed imaging in depth with pt and her family  -Fiberoptic exam showed, Left tonsillar hypertrophy L>R and Right vocal fold hemorrhage  -Discussed potential etiologies with family  -Concern for SCC of left tonsil vs Lymphoma vs tonsillar infectious asymmetry  -DL biopsy with left tonsillectomy for diagnostic purposes, planned for Wednesday  -Risks and benefits of DL biopsy explained in depth, all questions answered with patient and family  -They are in agreement with this plan

## 2024-05-16 NOTE — HISTORY OF PRESENT ILLNESS
[de-identified] : 69-year-old female past medical history of hypertension, hyperlipidemia was initially seen for neck pain and concern SCC of left tonsil vs Lymphoma vs tonsillar infectious asymmetry. She presents today to review PET scan results and to discuss upcoming surgery Reports she is still taking Bactrim as prescribed--no improvement in symptoms.  Reports intermittent globus sensation. Patient denies dysphagia, odynophagia, dyspnea, dysphonia, hemoptysis, otalgia, recent fevers/infections, chills, night sweats, weight loss.      PET scan from 5/8/24 showed  1. FDG-avid mass in left palatine tonsil/glossotonsillar sulcus is compatible with neoplasm. 2. Mildly FDG-avid prominent left level 2 cervical lymph node is indeterminate. Further characterization may be performed with ultrasound and percutaneous needle biopsy. 3. Few non-FDG-avid calcified left apical lung nodules unchanged on prior studies dating back to 10/22/2021, and few small FDG-avid mediastinal and bilateral hilar lymph nodes may represent sequela of infection/granulomatous disease. Metastatic disease is unlikely.    Patient was in the ED in April for sudden onset dizziness at [facility] had stroke workup included CT angio head and neck which showed incidentally a 2.4 cm left oropharyngeal mass. Reports generalized malaise, low grade fevers since ED 24 days ago, difficulty swallowing food and liquids and a 5 lb weight loss over past month   Denies smoking or ETOH use   IMPRESSION: Left tonsillar fossa mass up to 3.5 cm in size, increased from 4/12/24 though comparison is limited.  Tissue sampling advised, with lymphoma or squamous cell carcinoma both considered.  No additional mass or airway compromise.  Mildly enlarged left level 2 node is suspicious for regional metastasis and can be further assessed on PET-CT or biopsy as needed

## 2024-05-16 NOTE — ASU DISCHARGE PLAN (ADULT/PEDIATRIC) - CARE PROVIDER_API CALL
Krysta Nunez  Otolaryngology  [address] [place] [id number]  Phone: [phone]  Fax: [phone]  Follow Up Time:

## 2024-05-17 ENCOUNTER — NON-APPOINTMENT (OUTPATIENT)
Age: 69
End: 2024-05-17

## 2024-05-17 LAB — TM INTERPRETATION: SIGNIFICANT CHANGE UP

## 2024-05-20 ENCOUNTER — TRANSCRIPTION ENCOUNTER (OUTPATIENT)
Age: 69
End: 2024-05-20

## 2024-05-21 LAB — HEMATOPATHOLOGY REPORT: SIGNIFICANT CHANGE UP

## 2024-05-22 NOTE — ASU PREOP CHECKLIST - AICD PRESENT
[FreeTextEntry1] : Elderly patient with severe dyspnea and severe heart failure with preserved ejection fraction with secondary pulmonary hypertension. She is stable from pulmonary perspective. Do not see much to add at this point Continue with supplemental oxygen will renew as needed
no
None

## 2024-06-03 ENCOUNTER — APPOINTMENT (OUTPATIENT)
Dept: OTOLARYNGOLOGY | Facility: CLINIC | Age: 69
End: 2024-06-03
Payer: MEDICARE

## 2024-06-03 VITALS
HEART RATE: 64 BPM | BODY MASS INDEX: 21.37 KG/M2 | WEIGHT: 106 LBS | OXYGEN SATURATION: 97 % | HEIGHT: 59 IN | SYSTOLIC BLOOD PRESSURE: 110 MMHG | DIASTOLIC BLOOD PRESSURE: 73 MMHG

## 2024-06-03 LAB — CHROM ANALY OVERALL INTERP SPEC-IMP: SIGNIFICANT CHANGE UP

## 2024-06-03 PROCEDURE — 99024 POSTOP FOLLOW-UP VISIT: CPT

## 2024-06-03 NOTE — ASSESSMENT
[FreeTextEntry1] : 69 year old female presents for evaluation of tonsil mass found on CT, Left tonsillar fossa mass up to 3.5 cm in size She is s/p DL biopsy with Left tonsillectomy on 5/16/24.  Pathology from OR consistent with Reactive tonsil with follicular hyperplasia.   -She is doing well in clinic today  -Reviewed Pathology in depth with pt and her family -- not evidence of malignancy, likely chronic tonsillitis.  -Follow up in 3 months -They are in agreement with this plan

## 2024-06-03 NOTE — REASON FOR VISIT
[Subsequent Evaluation] : a subsequent evaluation for [Patient Declined  Services] : - None: Patient declined  services [Family Member] : family member [FreeTextEntry1] : Neck pain  [Interpreters_Relationshiptopatient] : Daughter [FreeTextEntry3] : Mandarin Patient declined offer of translation service. Patient preferred to use accompanying family member/friend for translation.  [TWNoteComboBox1] : Other

## 2024-06-03 NOTE — HISTORY OF PRESENT ILLNESS
[de-identified] : 69-year-old female past medical history of hypertension, hyperlipidemia was initially seen for neck pain and concern SCC of left tonsil vs Lymphoma vs tonsillar infectious asymmetry. She is s/p DL biopsy with Left tonsillectomy on 5/16/24.  Pathology from OR consistent with Reactive tonsil with follicular hyperplasia.  She is doing well today in clinic  Patient denies dysphagia, odynophagia, dyspnea, dysphonia, hemoptysis, otalgia, recent fevers/infections, chills, night sweats, weight loss.  PET scan from 5/8/24 showed  1. FDG-avid mass in left palatine tonsil/glossotonsillar sulcus is compatible with neoplasm. 2. Mildly FDG-avid prominent left level 2 cervical lymph node is indeterminate. Further characterization may be performed with ultrasound and percutaneous needle biopsy. 3. Few non-FDG-avid calcified left apical lung nodules unchanged on prior studies dating back to 10/22/2021, and few small FDG-avid mediastinal and bilateral hilar lymph nodes may represent sequela of infection/granulomatous disease. Metastatic disease is unlikely.    Patient was in the ED in April for sudden onset dizziness at [place] had stroke workup included CT angio head and neck which showed incidentally a 2.4 cm left oropharyngeal mass. Reports generalized malaise, low grade fevers since ED 24 days ago, difficulty swallowing food and liquids and a 5 lb weight loss over past month   Denies smoking or ETOH use   IMPRESSION: Left tonsillar fossa mass up to 3.5 cm in size, increased from 4/12/24 though comparison is limited.  Tissue sampling advised, with lymphoma or squamous cell carcinoma both considered.  No additional mass or airway compromise.  Mildly enlarged left level 2 node is suspicious for regional metastasis and can be further assessed on PET-CT or biopsy as needed

## 2024-06-20 ENCOUNTER — APPOINTMENT (OUTPATIENT)
Dept: PULMONOLOGY | Facility: CLINIC | Age: 69
End: 2024-06-20
Payer: MEDICARE

## 2024-06-20 VITALS
SYSTOLIC BLOOD PRESSURE: 102 MMHG | HEIGHT: 59.06 IN | BODY MASS INDEX: 21.17 KG/M2 | DIASTOLIC BLOOD PRESSURE: 68 MMHG | WEIGHT: 105 LBS | HEART RATE: 80 BPM

## 2024-06-20 DIAGNOSIS — R93.89 ABNORMAL FINDINGS ON DIAGNOSTIC IMAGING OF OTHER SPECIFIED BODY STRUCTURES: ICD-10-CM

## 2024-06-20 DIAGNOSIS — Z13.83 ENCOUNTER FOR SCREENING FOR RESPIRATORY DISORDER NEC: ICD-10-CM

## 2024-06-20 DIAGNOSIS — R06.83 SNORING: ICD-10-CM

## 2024-06-20 DIAGNOSIS — R91.1 SOLITARY PULMONARY NODULE: ICD-10-CM

## 2024-06-20 DIAGNOSIS — Q24.8 OTHER SPECIFIED CONGENITAL MALFORMATIONS OF HEART: ICD-10-CM

## 2024-06-20 PROCEDURE — ZZZZZ: CPT

## 2024-06-20 PROCEDURE — 94726 PLETHYSMOGRAPHY LUNG VOLUMES: CPT

## 2024-06-20 PROCEDURE — 94060 EVALUATION OF WHEEZING: CPT

## 2024-06-20 PROCEDURE — 99205 OFFICE O/P NEW HI 60 MIN: CPT | Mod: 25

## 2024-06-20 PROCEDURE — 94729 DIFFUSING CAPACITY: CPT

## 2024-06-20 RX ORDER — OSELTAMIVIR PHOSPHATE 75 MG/1
75 CAPSULE ORAL
Qty: 10 | Refills: 0 | Status: DISCONTINUED | COMMUNITY
Start: 2024-03-26 | End: 2024-06-20

## 2024-06-20 RX ORDER — SULFAMETHOXAZOLE AND TRIMETHOPRIM 800; 160 MG/1; MG/1
800-160 TABLET ORAL TWICE DAILY
Qty: 20 | Refills: 0 | Status: DISCONTINUED | COMMUNITY
Start: 2024-05-06 | End: 2024-06-20

## 2024-06-20 RX ORDER — HYDROCORTISONE 25 MG/G
2.5 CREAM TOPICAL
Qty: 28 | Refills: 0 | Status: DISCONTINUED | COMMUNITY
Start: 2024-03-28 | End: 2024-06-20

## 2024-06-20 RX ORDER — TAMSULOSIN HYDROCHLORIDE 0.4 MG/1
0.4 CAPSULE ORAL
Qty: 180 | Refills: 0 | Status: DISCONTINUED | COMMUNITY
Start: 2023-12-08 | End: 2024-06-20

## 2024-06-20 NOTE — ASSESSMENT
[FreeTextEntry1] : 69F HTN, HLD, and recent tonsillar mass presenting for initial pulmonary evaluation of an abnormal CT chest.  #Lung Nodule - Patient has TEA partially calcified nodules which appear stable compared to her CT from 10/2021 and which were NOT PET avoid. However, given the reading on CT as concerning for malignancy will plan to repeat CT chest at 2 months (July 2024).  - Discussed with patient and daughter that if nodules grow or change may need to consider further investigation with biopsy vs excision depending on findings - Patient without weight loss, night sweats, hemoptysis, or pulmonary symptoms - Given calcified nodules and appearance of stability I suspect this is related to a prior granulomatous infection however given recent concerns for malignancy further workup is appropriate.  #Snoring - Discussed possibility of sleep disorder breathing with patient and daughter and they defer testing at this time  #Pericardial cyst - Follow-up with Dr. Varma - Prior echo noted  #Pulmonary Function Testing - Reviewed with patient and daughter - Normal spirometry, normal lung volumes, normal diffusion capacity - No significant bronchodilator response  #Health Maintenance Spirometry: Reviewed  Smoking status: Never smoker  Pneumococcal vaccination status: patient is a candidate - unknown if completed. Recommended today but deferred [place] Sleepiness Scale: Not a sleep visit  #Follow-up in 2 months after repeat CT chest - All questions answered   The above plan was discussed with RAJ YOUNGBLOOD  in detail. Patient verbalized understanding and agrees with plan as detailed above. Patient was provided education and counselling on current diagnosis/symptoms, diagnostic work up, treatment options and potential side effects of any prescribed therapy/therapies. JAIMEKARINA  was advised to call our clinic at [phone] for any new or worsening symptoms, or with any questions or concerns. In case of acute onset of respiratory symptoms or worsening presentation, patient was advised to present to nearest emergency room for further evaluation. JAIMEKARINA  expressed understanding and all questions/concerns were addressed.

## 2024-06-20 NOTE — PHYSICAL EXAM
[No Acute Distress] : no acute distress [Well Nourished] : well nourished [No Deformities] : no deformities [Well Developed] : well developed [Normal Oropharynx] : normal oropharynx [Normal Appearance] : normal appearance [Supple] : supple [No Neck Mass] : no neck mass [Normal Rate/Rhythm] : normal rate/rhythm [Normal S1, S2] : normal s1, s2 [No Resp Distress] : no resp distress [No Acc Muscle Use] : no acc muscle use [Normal Rhythm and Effort] : normal rhythm and effort [Clear to Auscultation Bilaterally] : clear to auscultation bilaterally [Benign] : benign [Soft] : soft [Normal Gait] : normal gait [No Clubbing] : no clubbing [No Cyanosis] : no cyanosis [No Edema] : no edema [Normal Color/ Pigmentation] : normal color/ pigmentation [No Focal Deficits] : no focal deficits [No Motor Deficits] : no motor deficits [Oriented x3] : oriented x3 [Normal Mood] : normal mood [Normal Affect] : normal affect [TextBox_11] : NCAT, EOMI, anicteric, MMM, nares clear, no thrush [TextBox_68] : no wheeze or rhonchi

## 2024-06-20 NOTE — REASON FOR VISIT
[Initial] : an initial visit [Abnormal CXR/ Chest CT] : an abnormal CXR/ chest CT [Family Member] : family member [[employer] ] : provided by [employer]   [Time Spent: ____ minutes] : Total time spent using  services: [unfilled] minutes. The patient's primary language is not English thus required  services. [Interpreters_IDNumber] : [id number] [FreeTextEntry3] : Mandarin [TWNoteComboBox1] : Other

## 2024-06-20 NOTE — REVIEW OF SYSTEMS
[Ear Disturbance] : ear disturbance [Fever] : no fever [Fatigue] : no fatigue [Chills] : no chills [Epistaxis] : no epistaxis [Nasal Congestion] : no nasal congestion [Postnasal Drip] : no postnasal drip [Cough] : no cough [Hemoptysis] : no hemoptysis [Chest Tightness] : no chest tightness [Frequent URIs] : no frequent URIs [Sputum] : no sputum [Wheezing] : no wheezing [Chest Discomfort] : no chest discomfort [Claudication] : no claudication [Edema] : no edema [Leg Cramps] : no leg cramps [GERD] : no gerd [Abdominal Pain] : no abdominal pain [Dysuria] : no dysuria [Arthralgias] : no arthralgias [Myalgias] : no myalgias [Rash] : no rash [Headache] : no headache [Focal Weakness] : no focal weakness [Seizures] : no seizures [Head Injury] : no head injury [Depression] : no depression [Anxiety] : no anxiety [Diabetes] : no diabetes [Thyroid Problem] : no thyroid problem [Obesity] : no obesity

## 2024-06-20 NOTE — HISTORY OF PRESENT ILLNESS
[Never] : never [Difficulty Breathing During Exertion] : dyspnea on exertion [Feelings Of Weakness On Exertion] : exercise intolerance [Cough] : coughing [Wheezing] : wheezing [Nasal Passage Blockage (Stuffiness)] : edema [Nonspecific Pain, Swelling, And Stiffness] : chest pain [Fever] : fever [TextBox_4] : 69F HTN, HLD, and recent tonsillar mass presenting for initial pulmonary evaluation of an abnormal CT chest.  She was recently found to have a tonsillar mass which was PET avid. This was discovered during workup for neck pain. She was seen by ENT and underwent a surgical intervention. The pathology was negative for malignancy and felt to be possibly related to chronic tonsillitis.   As part of her initial workup and in the ED she reported hemoptysis x 1 she underwent a CTPA. There was no evidence of pulmonary emboli. There was however concern for a pericardial cyst and TEA partially calcified nodules. These nodules were not visualized on PET/CT and suspected as being benign. - However, the CT chest read them as potentially metastatic disease.  - On review of prior imaging the patient also had TEA calcified nodules on a CT from October 2021.  There are no reports of fevers, night sweats, or weight loss. The patient does not cough and does not have any reported pulmonary history. the patient is a never smoker. The patient is originally from [place].  - The hemoptysis was reported as a one time event and she has not had any further episodes of hemoptysis.  The patient does snore but denies daytime fatigue. She is not interested in ALONDRA testing per discussion today.  The patient follows with Dr. Varma for evaluation and management of the pericardial cyst. She has had recent cardiac testing.  PFTs: 6/20/24 - FEV1 2.02L (106%), FVC 2.65L (105%), FEV1/FVC 76%, FEF 25-75 97%, %, RV/TLC 40%, DLCO 114%   5/8/24 - IMPRESSION: Abnormal skull-to-thigh FDG-PET/CT scan. 1. FDG-avid mass in left palatine tonsil/glossotonsillar sulcus is compatible with neoplasm. 2. Mildly FDG-avid prominent left level 2 cervical lymph node is indeterminate. Further characterization may be performed with ultrasound and percutaneous needle biopsy. 3. Few non-FDG-avid calcified left apical lung nodules unchanged on prior studies dating back to 10/22/2021, and few small FDG-avid mediastinal and bilateral hilar lymph nodes may represent sequela of infection/granulomatous disease. Metastatic disease is unlikely. [TextBox_9] : ECHO: 4/15/24 - CONCLUSIONS:  1. Left ventricular systolic function is normal with an ejection fraction visually estimated at 65 %. 2. Trace mitral regurgitation. 3. No aortic regurgitation. 4. Mild tricuspid regurgitation. _____________________________________________________________________________________ FINDINGS: Left Ventricle: The left ventricular cavity is normal in size. Left ventricular wall thickness is normal. Left ventricular systolic function is normal with an ejection fraction visually estimated at 65%. Right Ventricle: The right ventricular cavity is normal in size, with normal wall thickness and normal systolic function. Left Atrium: The left atrium is normal in size. Right Atrium: The right atrium is normal in size. Interatrial Septum: The interatrial septum appears intact. Aortic Valve: The aortic valve is tricuspid with normal leaflet excursion. There is no aortic valve stenosis. There is no evidence of aortic regurgitation. Mitral Valve: Structurally normal mitral valve with normal leaflet excursion. There is no mitral valve stenosis. There is trace mitral regurgitation. Tricuspid Valve: Structurally normal tricuspid valve with normal leaflet excursion. There is mild tricuspid regurgitation. Estimated pulmonary artery systolic pressure is 26 mmHg. Pulmonic Valve: The pulmonic valve was not well visualized. Aorta: The aortic root at the sinuses of Valsalva is normal in size. Pericardium: No pericardial effusion seen. Systemic Veins: The inferior vena cava is normal in size (normal <2.1cm) with normal inspiratory collapse (normal >50%) consistent with normal right atrial pressure ( R 3, range 0-5mmHg) [TextBox_12] : 10/22/21 LUNGS AND LARGE AIRWAYS: Central airways are patent. No large focal consolidation. There is left apical scarring with several partially calcified nodules measuring 5 mm on image 83 series 6, 7 mm image 86 series 6, and 5 mm image 103 series 6. Nonspecific dependent peripheral reticulation and groundglass of the lung parenchyma. There is a 0.3 cm nodule along the horizontal fissure, image 214 series 6. PLEURA: No pleural effusion or pneumothorax. VESSELS: Normal size of the thoracic aorta. Main pulmonary artery size is within normal limits. No central pulmonary embolus. HEART: Heart size is within normal limits. Trace pericardial fluid. There is 2 cm right-sided cardiophrenic pericardial cyst. MEDIASTINUM AND KIKO: There is a 2 cm right-sided cardiophrenic pericardial cyst. Small volume lymph nodes of the thorax. Esophagus is limited in evaluation due to underdistention. CHEST WALL AND LOWER NECK: No chest wall hematoma. Irregularity of the right lateral 10th rib may reflect a nondisplaced fracture.  ABDOMEN AND PELVIS: LIVER: A shallow linear lucency of the posterior right hepatic lobe in peripheral location approximately 1 cm in depth, image 42 series 3, associated with some hyperemia of the posterior right hepatic lobe on the arterial phase, likely represents a grade 1-2 liver laceration. No significant perihepatic fluid. Main portal vein is patent. BILE DUCTS: No intrahepatic biliary distention. Borderline prominent common bile duct size of 7-8 mm. GALLBLADDER: No calcified gallstone. SPLEEN: No perisplenic fluid or splenic laceration. Normal size of the spleen. PANCREAS: No peripancreatic fluid or peripancreatic ductal dilatation. Tiny 2 mm hypodensity of the pancreatic head, image 147 series 5 may represent a sidebranch IPMN or sequelae of prior pancreatitis. ADRENALS: Unremarkable. KIDNEYS/URETERS: Nonspecific striated appearance of the left kidney seen at the upper pole, image 38 series 3. This may represent focus of renal parenchymal infection, infarct, or sequelae of recent trauma. No perinephric fluid is identified. Mild bilateral renal collecting system fullness and ureteral fullness, opacified with contrast. There is no extravasation of contrast from the collecting system identified.Bilateral renal hypodensities, at least one of which is indeterminate, others which are simple/proteinaceous cysts. For example, there is an indeterminate 1 cm left renal lesion, image 47 series 3. Numerous additional renal hypodensities too small to characterize  BLADDER: Mildly distended. REPRODUCTIVE ORGANS: Hysterectomy.  BOWEL: Limited evaluation due to lack oral contrast. Stomach is underdistended. No small bowel obstruction. Mild stool burden of the colon limits evaluation of the colonic mucosa. PERITONEUM: Trace perihepatic fluid/inflammation. No free air or loculated fluid collection. VESSELS: No aneurysm of the abdominal aorta. Atheromatous changes. RETROPERITONEUM/LYMPH NODES: Small volume nodes. ABDOMINAL WALL: Unremarkable. BONES: Degenerative changes of the visualized bones. Please note that central canal and neural foramina are not adequately assessed on this study. Well marginated sclerotic focus of the right posterior acetabulum, image 132 series 3 is most likely a bone island in the absence of known primary malignancy.  IMPRESSION:  CHEST: 1) Irregularity of the lateral right 10th rib may represent a nondisplaced rib fracture. No pneumothorax. 2) Few partially calcified nodules of the left upper lobe apical segment measuring up to 0.7 cm can be followed dedicated chest CT in 6 months.  ABDOMEN/PELVIS: 1) Linear lucency of the posterior right hepatic lobe peripheral margin, with surrounding hyperemia, reflects grade 1-2 liver laceration. Trace perihepatic fluid/inflammation. No perisplenic fluid. Interval short-term follow-up CT is recommended in 48 hours evaluate for stability. 2) Nonspecific striated appearance of the left kidney seen at the upper pole, image 38 series 3. This may represent focus of renal parenchymal infection, infarct, or sequelae of recent trauma. Correlate with urinalysis. 3) No vascular or hollow viscus injury of the abdomen/pelvis. 4) Numerous renal lesions at least one of which is indeterminate of the left kidney on image 47 series 3. Others may represent simple/proteinaceous cyst. Other renal hypodensities are too small to characterize. Tiny 2 mm hypodensity of the pancreatic head, image 147 series 5 may represent a sidebranch IPMN or sequelae of prior pancreatitis. Correlation with renal protocol abdominal MRI and MRCP is recommended for further characterization of these findings. [TextBox_27] : 5/5/24 - LUNGS AND AIRWAYS: Patent central airways. There are 3 1 cm nodules in the left medial apex which may represent metastases PLEURA: No pleural effusion. MEDIASTINUM AND KIKO: There is a 2.4 x 1.5 cm soft tissue nodule in the right cardiophrenic angle which may represent metastatic lesion. VESSELS: Within normal limits. HEART: Heart size is normal. No pericardial effusion. CHEST WALL AND LOWER NECK: Within normal limits. VISUALIZED UPPER ABDOMEN: Right kidney nonobstructing 4 mm stone upper pole BONES: Within normal limits.  IMPRESSION: No evidence of pulmonary embolus. 3 nodules measuring 1 cm in the left medial apex which may represent metastases.  2.4 x 1.5 cm soft tissue nodule in the right cardiophrenic angle which may also represent metastatic lesion versus pericardial cyst.

## 2024-07-06 ENCOUNTER — APPOINTMENT (OUTPATIENT)
Dept: CT IMAGING | Facility: CLINIC | Age: 69
End: 2024-07-06
Payer: MEDICARE

## 2024-07-06 ENCOUNTER — OUTPATIENT (OUTPATIENT)
Dept: OUTPATIENT SERVICES | Facility: HOSPITAL | Age: 69
LOS: 1 days | End: 2024-07-06
Payer: MEDICARE

## 2024-07-06 DIAGNOSIS — Z98.890 OTHER SPECIFIED POSTPROCEDURAL STATES: Chronic | ICD-10-CM

## 2024-07-06 DIAGNOSIS — R91.1 SOLITARY PULMONARY NODULE: ICD-10-CM

## 2024-07-06 DIAGNOSIS — O34.29 MATERNAL CARE DUE TO UTERINE SCAR FROM OTHER PREVIOUS SURGERY: Chronic | ICD-10-CM

## 2024-07-06 PROCEDURE — 71250 CT THORAX DX C-: CPT

## 2024-07-06 PROCEDURE — 71250 CT THORAX DX C-: CPT | Mod: 26

## 2024-09-23 ENCOUNTER — APPOINTMENT (OUTPATIENT)
Dept: OTOLARYNGOLOGY | Facility: CLINIC | Age: 69
End: 2024-09-23
Payer: MEDICARE

## 2024-09-23 VITALS
HEART RATE: 68 BPM | WEIGHT: 105 LBS | DIASTOLIC BLOOD PRESSURE: 84 MMHG | OXYGEN SATURATION: 99 % | SYSTOLIC BLOOD PRESSURE: 130 MMHG | BODY MASS INDEX: 21.17 KG/M2 | HEIGHT: 59.06 IN

## 2024-09-23 PROCEDURE — 99214 OFFICE O/P EST MOD 30 MIN: CPT | Mod: 25

## 2024-09-23 PROCEDURE — 31575 DIAGNOSTIC LARYNGOSCOPY: CPT

## 2024-09-23 NOTE — REASON FOR VISIT
[Subsequent Evaluation] : a subsequent evaluation for [Family Member] : family member [Patient Declined  Services] : - None: Patient declined  services [FreeTextEntry1] : Neck pain  [Interpreters_Relationshiptopatient] : Daughter [FreeTextEntry3] : Mandarin Patient declined offer of translation service. Patient preferred to use accompanying family member/friend for translation.  [TWNoteComboBox1] : Other

## 2024-09-23 NOTE — HISTORY OF PRESENT ILLNESS
[de-identified] : 69-year-old female past medical history of hypertension, hyperlipidemia was initially seen for neck pain and concern SCC of left tonsil vs Lymphoma vs tonsillar infectious asymmetry. She is s/p DL biopsy with Left tonsillectomy on 5/16/24.  Pathology from OR consistent with Reactive tonsil with follicular hyperplasia   She is doing well today in clinic, reports some throat discomfort which started 12 days ago, when she had a cold  Patient denies dysphagia, odynophagia, dyspnea, dysphonia, hemoptysis, otalgia, recent fevers/infections, chills, night sweats, weight loss.  PET scan from 5/8/24 showed  1. FDG-avid mass in left palatine tonsil/glossotonsillar sulcus is compatible with neoplasm. 2. Mildly FDG-avid prominent left level 2 cervical lymph node is indeterminate. Further characterization may be performed with ultrasound and percutaneous needle biopsy. 3. Few non-FDG-avid calcified left apical lung nodules unchanged on prior studies dating back to 10/22/2021, and few small FDG-avid mediastinal and bilateral hilar lymph nodes may represent sequela of infection/granulomatous disease. Metastatic disease is unlikely.    Patient was in the ED in April for sudden onset dizziness at [facility] had stroke workup included CT angio head and neck which showed incidentally a 2.4 cm left oropharyngeal mass. Reports generalized malaise, low grade fevers since ED 24 days ago, difficulty swallowing food and liquids and a 5 lb weight loss over past month   Denies smoking or ETOH use   IMPRESSION: Left tonsillar fossa mass up to 3.5 cm in size, increased from 4/12/24 though comparison is limited.  Tissue sampling advised, with lymphoma or squamous cell carcinoma both considered.  No additional mass or airway compromise.  Mildly enlarged left level 2 node is suspicious for regional metastasis and can be further assessed on PET-CT or biopsy as needed

## 2024-09-23 NOTE — ASSESSMENT
[FreeTextEntry1] : 69 year old female presents for evaluation of tonsil mass found on CT, Left tonsillar fossa mass up to 3.5 cm in size She is s/p DL biopsy with Left tonsillectomy on 5/16/24.  Pathology from OR consistent with Reactive tonsil with follicular hyperplasia.   -She is doing well in clinic today  -No concerning masses or lesions on physical or fiberoptic exam  -Cough, OTC Robitussin prn -Follow up in 4 month -They are in agreement with this plan

## 2024-09-23 NOTE — HISTORY OF PRESENT ILLNESS
[de-identified] : 69-year-old female past medical history of hypertension, hyperlipidemia was initially seen for neck pain and concern SCC of left tonsil vs Lymphoma vs tonsillar infectious asymmetry. She is s/p DL biopsy with Left tonsillectomy on 5/16/24.  Pathology from OR consistent with Reactive tonsil with follicular hyperplasia   She is doing well today in clinic, reports some throat discomfort which started 12 days ago, when she had a cold  Patient denies dysphagia, odynophagia, dyspnea, dysphonia, hemoptysis, otalgia, recent fevers/infections, chills, night sweats, weight loss.  PET scan from 5/8/24 showed  1. FDG-avid mass in left palatine tonsil/glossotonsillar sulcus is compatible with neoplasm. 2. Mildly FDG-avid prominent left level 2 cervical lymph node is indeterminate. Further characterization may be performed with ultrasound and percutaneous needle biopsy. 3. Few non-FDG-avid calcified left apical lung nodules unchanged on prior studies dating back to 10/22/2021, and few small FDG-avid mediastinal and bilateral hilar lymph nodes may represent sequela of infection/granulomatous disease. Metastatic disease is unlikely.    Patient was in the ED in April for sudden onset dizziness at [place] had stroke workup included CT angio head and neck which showed incidentally a 2.4 cm left oropharyngeal mass. Reports generalized malaise, low grade fevers since ED 24 days ago, difficulty swallowing food and liquids and a 5 lb weight loss over past month   Denies smoking or ETOH use   IMPRESSION: Left tonsillar fossa mass up to 3.5 cm in size, increased from 4/12/24 though comparison is limited.  Tissue sampling advised, with lymphoma or squamous cell carcinoma both considered.  No additional mass or airway compromise.  Mildly enlarged left level 2 node is suspicious for regional metastasis and can be further assessed on PET-CT or biopsy as needed

## 2024-09-23 NOTE — PHYSICAL EXAM
[Midline] : trachea located in midline position [Normal] : no rashes [de-identified] : s/p Left tonsillectomy, healing well   right vocal fold hemorrhage

## 2024-09-23 NOTE — PHYSICAL EXAM
[Midline] : trachea located in midline position [Normal] : no rashes [de-identified] : s/p Left tonsillectomy, healing well   right vocal fold hemorrhage

## 2024-11-07 PROBLEM — Z98.890 S/P ABLATION OPERATION FOR ARRHYTHMIA: Status: ACTIVE | Noted: 2024-11-07

## 2024-11-08 ENCOUNTER — APPOINTMENT (OUTPATIENT)
Dept: CARDIOLOGY | Facility: CLINIC | Age: 69
End: 2024-11-08
Payer: MEDICARE

## 2024-11-08 VITALS
SYSTOLIC BLOOD PRESSURE: 130 MMHG | WEIGHT: 105 LBS | DIASTOLIC BLOOD PRESSURE: 82 MMHG | BODY MASS INDEX: 21.17 KG/M2 | OXYGEN SATURATION: 98 % | HEART RATE: 71 BPM

## 2024-11-08 DIAGNOSIS — Q24.8 OTHER SPECIFIED CONGENITAL MALFORMATIONS OF HEART: ICD-10-CM

## 2024-11-08 DIAGNOSIS — I10 ESSENTIAL (PRIMARY) HYPERTENSION: ICD-10-CM

## 2024-11-08 DIAGNOSIS — Z98.890 OTHER SPECIFIED POSTPROCEDURAL STATES: ICD-10-CM

## 2024-11-08 DIAGNOSIS — Z86.79 OTHER SPECIFIED POSTPROCEDURAL STATES: ICD-10-CM

## 2024-11-08 DIAGNOSIS — R59.0 LOCALIZED ENLARGED LYMPH NODES: ICD-10-CM

## 2024-11-08 PROCEDURE — 99214 OFFICE O/P EST MOD 30 MIN: CPT

## 2024-11-18 NOTE — ED ADULT TRIAGE NOTE - BP NONINVASIVE DIASTOLIC (MM HG)
Contacted pt via phone and she states that she got dizzy and lightheaded and fell in the store, but her mom caught her.  States she woke up as soon as she went down to the ground.  She did feel better once she ate something. Advised pt that she will need to be sure to eat something every couple of hours to make sure her blood sugar doesn't drop and to drink plenty of water to stay hydrated.  Pt voiced understanding and is scheduled tomorrow for evaluation.    84

## 2024-11-23 NOTE — ED ADULT TRIAGE NOTE - PAIN: PRESENCE, MLM
Brief Operative Note    Patient: Juan Alberto Guerra 29 year old male    MRN: [id number]    Surgeon(s): Amado Carrera DPM  Phone Number: [phone]                       Surgeons and Role:     * Amado Carrera DPM - Primary    Assistant(s): None    Pre-Op Diagnosis: Right foot fracture Lis Franc Dislocation      Post-Op Diagnosis: Right foot fracture Lis Franc Dislocation      Procedure: Procedure(s):  OPEN REDUCTION INTERNAL FIXATION OF MID FOOT    Anesthesia Type: General                                   Complications: None    Description: As noted    Findings: As noted in operative report    Specimens Removed: No specimens collected     Estimated Blood Loss: 50 mL    Assistant Tasks: None     Implants:   Implant Name Type Inv. Item Serial No.  Lot No. LRB No. Used Action   4.0 X 4.0 SCREW    [employer]  Right 1 Implanted   4.0 X 37.5 ST    [employer]  Right 1 Implanted   4.0 X 32.5    [employer]  Right 1 Implanted         I was present for the key portions of the procedure and was immediately available for the non-key portions        
complains of pain/discomfort

## 2024-12-16 ENCOUNTER — APPOINTMENT (OUTPATIENT)
Dept: PULMONOLOGY | Facility: CLINIC | Age: 69
End: 2024-12-16
Payer: MEDICARE

## 2024-12-16 VITALS
BODY MASS INDEX: 22.98 KG/M2 | RESPIRATION RATE: 18 BRPM | SYSTOLIC BLOOD PRESSURE: 139 MMHG | WEIGHT: 114 LBS | OXYGEN SATURATION: 97 % | DIASTOLIC BLOOD PRESSURE: 79 MMHG | TEMPERATURE: 97.4 F | HEART RATE: 72 BPM | HEIGHT: 59 IN

## 2024-12-16 DIAGNOSIS — R91.1 SOLITARY PULMONARY NODULE: ICD-10-CM

## 2024-12-16 DIAGNOSIS — Z13.83 ENCOUNTER FOR SCREENING FOR RESPIRATORY DISORDER NEC: ICD-10-CM

## 2024-12-16 DIAGNOSIS — R93.89 ABNORMAL FINDINGS ON DIAGNOSTIC IMAGING OF OTHER SPECIFIED BODY STRUCTURES: ICD-10-CM

## 2024-12-16 DIAGNOSIS — R06.83 SNORING: ICD-10-CM

## 2024-12-16 PROCEDURE — 99214 OFFICE O/P EST MOD 30 MIN: CPT

## 2024-12-16 PROCEDURE — G2211 COMPLEX E/M VISIT ADD ON: CPT

## 2024-12-16 RX ORDER — ESTRADIOL 0.1 MG/G
0.1 CREAM VAGINAL
Qty: 42 | Refills: 0 | Status: ACTIVE | COMMUNITY
Start: 2024-10-25

## 2024-12-16 RX ORDER — OMEGA-3-ACID ETHYL ESTERS CAPSULES 1 G/1
1 CAPSULE, LIQUID FILLED ORAL
Qty: 270 | Refills: 0 | Status: ACTIVE | COMMUNITY
Start: 2024-12-12

## 2024-12-16 RX ORDER — DICLOFENAC EPOLAMINE 0.01 G/1
1.3 SYSTEM TOPICAL
Qty: 60 | Refills: 0 | Status: DISCONTINUED | COMMUNITY
Start: 2024-01-20

## 2024-12-16 RX ORDER — FAMOTIDINE 20 MG/1
20 TABLET, FILM COATED ORAL
Qty: 30 | Refills: 0 | Status: ACTIVE | COMMUNITY
Start: 2024-07-12

## 2024-12-16 RX ORDER — MECLIZINE HYDROCHLORIDE 12.5 MG/1
12.5 TABLET ORAL
Qty: 12 | Refills: 0 | Status: ACTIVE | COMMUNITY
Start: 2024-08-22

## 2025-01-13 ENCOUNTER — APPOINTMENT (OUTPATIENT)
Dept: OTOLARYNGOLOGY | Facility: CLINIC | Age: 70
End: 2025-01-13
Payer: MEDICARE

## 2025-01-13 VITALS
BODY MASS INDEX: 22.98 KG/M2 | HEIGHT: 59 IN | DIASTOLIC BLOOD PRESSURE: 86 MMHG | WEIGHT: 114 LBS | SYSTOLIC BLOOD PRESSURE: 137 MMHG

## 2025-01-13 PROCEDURE — 99214 OFFICE O/P EST MOD 30 MIN: CPT | Mod: 25

## 2025-01-13 PROCEDURE — 31575 DIAGNOSTIC LARYNGOSCOPY: CPT

## 2025-03-25 ENCOUNTER — EMERGENCY (EMERGENCY)
Facility: HOSPITAL | Age: 70
LOS: 1 days | Discharge: HOME | End: 2025-03-25
Attending: EMERGENCY MEDICINE | Admitting: EMERGENCY MEDICINE
Payer: COMMERCIAL

## 2025-03-25 VITALS
SYSTOLIC BLOOD PRESSURE: 149 MMHG | HEART RATE: 73 BPM | DIASTOLIC BLOOD PRESSURE: 78 MMHG | HEIGHT: 60 IN | OXYGEN SATURATION: 94 % | RESPIRATION RATE: 16 BRPM | WEIGHT: 114.64 LBS | TEMPERATURE: 98 F

## 2025-03-25 DIAGNOSIS — Z98.890 OTHER SPECIFIED POSTPROCEDURAL STATES: Chronic | ICD-10-CM

## 2025-03-25 DIAGNOSIS — O34.29 MATERNAL CARE DUE TO UTERINE SCAR FROM OTHER PREVIOUS SURGERY: Chronic | ICD-10-CM

## 2025-03-25 LAB
APTT BLD: 23.2 SEC — LOW (ref 24.5–35.6)
D DIMER BLD IA.RAPID-MCNC: <150 NG/ML DDU — SIGNIFICANT CHANGE UP
INR BLD: 0.85 RATIO — SIGNIFICANT CHANGE UP (ref 0.85–1.16)
PROTHROM AB SERPL-ACNC: 10 SEC — SIGNIFICANT CHANGE UP (ref 9.9–13.4)

## 2025-03-25 PROCEDURE — 71045 X-RAY EXAM CHEST 1 VIEW: CPT | Mod: 26

## 2025-03-25 PROCEDURE — 99285 EMERGENCY DEPT VISIT HI MDM: CPT | Mod: 25

## 2025-03-25 RX ORDER — ONDANSETRON HCL/PF 4 MG/2 ML
4 VIAL (ML) INJECTION ONCE
Refills: 0 | Status: COMPLETED | OUTPATIENT
Start: 2025-03-25 | End: 2025-03-25

## 2025-03-25 RX ORDER — ACETAMINOPHEN 500 MG/5ML
1000 LIQUID (ML) ORAL ONCE
Refills: 0 | Status: COMPLETED | OUTPATIENT
Start: 2025-03-25 | End: 2025-03-25

## 2025-03-25 NOTE — ED ADULT NURSE NOTE - NSFALLRISKINTERV_ED_ALL_ED
Assistance OOB with selected safe patient handling equipment if applicable/Assistance with ambulation/Communicate fall risk and risk factors to all staff, patient, and family/Encourage patient to sit up slowly, dangle for a short time, stand at bedside before walking/Monitor gait and stability/Orthostatic vital signs/Provide patient with walking aids/Provide visual cue: yellow wristband, yellow gown, etc/Reinforce activity limits and safety measures with patient and family/Call bell, personal items and telephone in reach/Instruct patient to call for assistance before getting out of bed/chair/stretcher/Non-slip footwear applied when patient is off stretcher/[place] to call system/Physically safe environment - no spills, clutter or unnecessary equipment/Purposeful Proactive Rounding/Room/bathroom lighting operational, light cord in reach

## 2025-03-25 NOTE — ED PROVIDER NOTE - CARE PROVIDER_API CALL
Tan Blanton  Neurology  [address] [place] [address]  Phone: [phone]  Fax: [phone]  Follow Up Time: 1-3 Days

## 2025-03-25 NOTE — ED PROVIDER NOTE - CLINICAL SUMMARY MEDICAL DECISION MAKING FREE TEXT BOX
69-year-old female with headache, dizziness, nausea and vomiting and chest pain. Will evaluate for ACS, PE, intracranial pathology, CVA, dehydration, electrolyte abnormality, BPPV. We'll get labs, EKG, chest x-ray, CT head, CT engine head and neck. Will give IV fluids, Zofran, Pepcid, acetaminophen. Will reassess.

## 2025-03-25 NOTE — ED PROVIDER NOTE - NIH STROKE SCALE: 3. VISUAL, QM
As we discussed, you have a very severe case of obstructive sleep apnea  It is very important that we treat you  Ideally I would like you to have a sleep study in the sleep lab to calibrate CPAP, but if this cannot be done soon, I will order you an auto-CPAP    Prior to starting CPAP, I would like you to see an ear nose and throat specialist due to your history of nosebleeds which have worsened  With regard to your upcoming surgery, it is important to make sure the surgery team knows you have a very severe case of obstructive sleep apnea  The anesthesiologist should use precautions and it would be most ideal to use CPAP around your procedure  In general, it would be better if I could see you after you have had CPAP so we know the ideal settings they can use to keep you safe perioperatively  When you get the CPAP at home, please let me know if you have any problems getting used to it  The ideal is to use the machine all night long, sometimes there is an adjustment  I would recommend using a nasal saline spray once or twice a day to lessen nasal dryness which could contribute to nosebleeds  It is very important to avoid driving while drowsy, this can be very dangerous or even cause serious injury or death  If sleepy, it is not safe to get behind the wheel  If you are driving and feels sleepy, it is very important to pull over right away  Even losing control of the car for a split second can be deadly  If you feel you cannot control when sleepiness occurs and cannot prevented, it is important to not drive at all until this improves  Please let me know if you experience this as it is very important  Nursing Support:  When: Monday through Friday 7A-5PM except holidays  Where: Our direct line is [phone]  If you are having a true emergency please call 911  In the event that the line is busy or it is after hours please leave a voice message and we will return your call    Please speak clearly, leaving your full name, birth date, best number to reach you and the reason for your call  Medication refills: We will need the name of the medication, the dosage, the ordering provider, whether you get a 30 or 90 day refill, and the pharmacy name and address  Medications will be ordered by the provider only  Nurses cannot call in prescriptions  Please allow 7 days for medication refills  Physician requested updates: If your provider requested that you call with an update after starting medication, please be ready to provide us the medication and dosage, what time you take your medication, the time you attempt to fall asleep, time you fall asleep, when you wake up, and what time you get out of bed  Sleep Study Results: We will contact you with sleep study results and/or next steps after the physician has reviewed your testing  Sleep Apnea   AMBULATORY CARE:   Sleep apnea  is a condition that causes you to stop breathing often during sleep  Types of sleep apnea:   Obstructive sleep apnea (MYRIAM)  is the most common kind  The muscles and tissues around your throat relax and block air from passing through  Obesity, use of alcohol or cigarettes, or a family history are common causes  MYRIAM may increase your risk for complications after surgery  Central sleep apnea (CSA)  means your brain does not send signals to the muscles that control breathing  You do not take a breath even though your airway is open  Common causes include medical conditions such as heart failure, being older than 40, or use of opioids  Complex (or mixed) sleep apnea  means you have both obstructive and central sleep apnea      Common signs and symptoms:   Loud snoring or long pauses in breathing    Feeling sleepy, slow, and tired during the day    Snorting, gasping, or choking while you sleep, and waking up suddenly because of these    Feeling irritable during the day    Dry mouth or a headache in the mornings    Heavy night sweating    A hard time thinking, remembering things, or focusing on your tasks the following day    Call your local emergency number (911 in the [address]) if:   You have chest pain or trouble breathing  Call your doctor if:   You have new or worsening signs or symptoms  You have questions or concerns about your condition or care  Treatment  depends on the kind of apnea you have  A mouth device  may be needed if you have mild sleep apnea  These are designed to keep your throat open  Ask your dentist or healthcare provider about the best mouth device for you  A machine  may be used to help you get more air during sleep  A mask may be placed over your nose and mouth, or just your nose  The mask is hooked to the machine  You will get air through the mask  A continuous positive airway pressure (CPAP) machine  is used to keep your airway open during sleep  The machine blows a gentle stream of air into the mask when you breathe  This helps keep your airway open so you can breathe more regularly  Extra oxygen may be given through the machine  A bilevel positive airway pressure (BiPAP) machine  gives air but lowers the pressure when you breathe out  An adaptive servo-ventilator (ASV)  is a machine that learns your usual breathing pattern  Then, it uses pressure to give you air and prevent stops in your breathing  Surgery  to expand your airway or remove extra tissues may be needed  Surgery is usually only considered if other treatments do not work  Manage or prevent sleep apnea:   Reach and maintain a healthy weight  Ask your healthcare provider what a healthy weight is for you  Ask your provider to help you create a safe weight loss plan if you are overweight  Even a small goal of a 10% weight loss can improve your symptoms  Do not smoke  Nicotine and other chemicals in cigarettes and cigars can cause lung damage   Ask your healthcare provider for information if you currently smoke and need help to quit  E-cigarettes or smokeless tobacco still contain nicotine  Talk to your healthcare provider before you use these products  Do not drink alcohol or take sedative medicine before you go to sleep  Alcohol and sedatives can relax the muscles and tissues around your throat  This can block the airflow to your lungs  Sleep on your side or use pillows designed to prevent sleep apnea  This prevents your tongue or other tissues from blocking your throat  You can also raise the head of your bed  Follow up with your doctor or specialist as directed: You may need to have blood tests during your follow-up visits  Work with your provider to find the right breathing support equipment and settings for you  Write down your questions so you remember to ask them during your visits  © Copyright [employer] 2022 Information is for End User's use only and may not be sold, redistributed or otherwise used for commercial purposes  The above information is an  only  It is not intended as medical advice for individual conditions or treatments  Talk to your doctor, nurse or pharmacist before following any medical regimen to see if it is safe and effective for you  (0) No visual loss

## 2025-03-25 NOTE — ED PROVIDER NOTE - PROGRESS NOTE DETAILS
Pt reports her symptoms have all resolved  Labs and CT reports reviewed  Advised outpt followup with PCP this week for further assessment and workup. Will results were explained to the patient and her daughter at the bedside using a mandarin , ID number [id number]. All questions were answered. Patient symptoms are resolved she ambulated to the bathroom with steady gait and some longer feeling dizzy.

## 2025-03-25 NOTE — ED PROVIDER NOTE - PATIENT PORTAL LINK FT
You can access the FollowMyHealth Patient Portal offered by [facility] by registering at the following website: [email or web address]. By joining [employer]’s FollowMyHealth portal, you will also be able to view your health information using other applications (apps) compatible with our system.

## 2025-03-25 NOTE — ED PROVIDER NOTE - OBJECTIVE STATEMENT
69-year-old female with history of hypertension, hyperlipidemia is presenting with complaints of dizziness and headache that started approximately 5 PM that continued throughout the night followed by nausea and vomiting that started about 9 PM this evening. The family member did not give any analgesics for the headache but rather was just repeatedly checking patient's blood pressure. States she had for dinner about 7 PM this evening. States she still feels dizzy and still has a frontal parietal headache. Also reports she started to have chest pain for the last few hours. When asked for the pain as she rubs her entire chest surgery over pain. Denies any focal weakness. Denies any recent illnesses.

## 2025-03-25 NOTE — ED ADULT TRIAGE NOTE - AS TEMP SITE
Called Kajal back. Discussed options. She would like to proceed with neoadjuvant therapy with the goal of shrinking the tumor. She has also but back to smoking 1/2 ppd (from 2 ppd at her initial visit) and is trying to quit. I will reach out to Dr. Mayer' team to get her back in to initiate therapy.    Chel Farah MD     oral

## 2025-03-25 NOTE — ED PROVIDER NOTE - NSFOLLOWUPINSTRUCTIONS_ED_ALL_ED_FT
Follow up with your primary care doctor this week for fruther workup  Follow up with a neurologist  Return to the ER if symptoms worsen    Dizziness    Dizziness can manifest as a feeling of unsteadiness or light-headedness. You may feel like you are about to faint. This condition can be caused by a number of things, including medicines, dehydration, or illness. Drink enough fluid to keep your urine clear or pale yellow. Do not drink alcohol and limit your caffeine intake. Avoid quick or sudden movements.  Rise slowly from chairs and steady yourself until you feel okay. In the morning, first sit up on the side of the bed.    SEEK IMMEDIATE MEDICAL CARE IF YOU HAVE ANY OF THE FOLLOWING SYMPTOMS: vomiting, changes in your vision or speech, weakness in your arms or legs, trouble speaking or swallowing, chest pain, abdominal pain, shortness of breath, sweating, bleeding, headache, neck pain, or fever.

## 2025-03-25 NOTE — ED ADULT TRIAGE NOTE - CHIEF COMPLAINT QUOTE
"Suddenly her blood pressure went high and she started vomiting. At 5 o'clock she started getting dizzy"

## 2025-03-25 NOTE — ED ADULT NURSE NOTE - OBJECTIVE STATEMENT
hx HTN. as per son, pt has been c/o headache since 5pm, worsening this evening since 9pm. + n/v since 9pm, vomiting multiple times. pt having continued dizziness and headache at this time. states compliant with bp meds at home, bp 143/85 in ED room. pt A&OX4, able to speak in clear complete sentences, CORDOVA equal bilaterally. RR even and unlabored, skin warm dry and intact. attempted iv access x2, unsuccessful at this time. safety measures maintained, side rails up x2. son at bedside

## 2025-03-26 VITALS
SYSTOLIC BLOOD PRESSURE: 143 MMHG | DIASTOLIC BLOOD PRESSURE: 93 MMHG | HEART RATE: 73 BPM | TEMPERATURE: 98 F | RESPIRATION RATE: 17 BRPM | OXYGEN SATURATION: 96 %

## 2025-03-26 LAB
ALBUMIN SERPL ELPH-MCNC: 3.8 G/DL — SIGNIFICANT CHANGE UP (ref 3.3–5)
ALP SERPL-CCNC: 51 U/L — SIGNIFICANT CHANGE UP (ref 30–120)
ALT FLD-CCNC: 40 U/L — SIGNIFICANT CHANGE UP (ref 10–60)
ANION GAP SERPL CALC-SCNC: 7 MMOL/L — SIGNIFICANT CHANGE UP (ref 5–17)
APPEARANCE UR: ABNORMAL
AST SERPL-CCNC: 27 U/L — SIGNIFICANT CHANGE UP (ref 10–40)
BACTERIA # UR AUTO: NEGATIVE /HPF — SIGNIFICANT CHANGE UP
BASOPHILS # BLD AUTO: 0.05 K/UL — SIGNIFICANT CHANGE UP (ref 0–0.2)
BASOPHILS NFR BLD AUTO: 0.4 % — SIGNIFICANT CHANGE UP (ref 0–2)
BILIRUB SERPL-MCNC: 0.2 MG/DL — SIGNIFICANT CHANGE UP (ref 0.2–1.2)
BILIRUB UR-MCNC: NEGATIVE — SIGNIFICANT CHANGE UP
BUN SERPL-MCNC: 23 MG/DL — SIGNIFICANT CHANGE UP (ref 7–23)
CALCIUM SERPL-MCNC: 8.8 MG/DL — SIGNIFICANT CHANGE UP (ref 8.4–10.5)
CHLORIDE SERPL-SCNC: 102 MMOL/L — SIGNIFICANT CHANGE UP (ref 96–108)
CO2 SERPL-SCNC: 28 MMOL/L — SIGNIFICANT CHANGE UP (ref 22–31)
COLOR SPEC: YELLOW — SIGNIFICANT CHANGE UP
COMMENT - URINE: SIGNIFICANT CHANGE UP
CREAT SERPL-MCNC: 0.85 MG/DL — SIGNIFICANT CHANGE UP (ref 0.5–1.3)
DIFF PNL FLD: NEGATIVE — SIGNIFICANT CHANGE UP
EGFR: 75 ML/MIN/1.73M2 — SIGNIFICANT CHANGE UP
EGFR: 75 ML/MIN/1.73M2 — SIGNIFICANT CHANGE UP
EOSINOPHIL # BLD AUTO: 0.07 K/UL — SIGNIFICANT CHANGE UP (ref 0–0.5)
EOSINOPHIL NFR BLD AUTO: 0.6 % — SIGNIFICANT CHANGE UP (ref 0–6)
EPI CELLS # UR: PRESENT
GLUCOSE SERPL-MCNC: 127 MG/DL — HIGH (ref 70–99)
GLUCOSE UR QL: NEGATIVE MG/DL — SIGNIFICANT CHANGE UP
HCT VFR BLD CALC: 40.2 % — SIGNIFICANT CHANGE UP (ref 34.5–45)
HGB BLD-MCNC: 13.3 G/DL — SIGNIFICANT CHANGE UP (ref 11.5–15.5)
IMM GRANULOCYTES NFR BLD AUTO: 0.7 % — SIGNIFICANT CHANGE UP (ref 0–0.9)
KETONES UR-MCNC: NEGATIVE MG/DL — SIGNIFICANT CHANGE UP
LEUKOCYTE ESTERASE UR-ACNC: NEGATIVE — SIGNIFICANT CHANGE UP
LYMPHOCYTES # BLD AUTO: 1.69 K/UL — SIGNIFICANT CHANGE UP (ref 1–3.3)
LYMPHOCYTES # BLD AUTO: 14.2 % — SIGNIFICANT CHANGE UP (ref 13–44)
MCHC RBC-ENTMCNC: 31.6 PG — SIGNIFICANT CHANGE UP (ref 27–34)
MCHC RBC-ENTMCNC: 33.1 G/DL — SIGNIFICANT CHANGE UP (ref 32–36)
MCV RBC AUTO: 95.5 FL — SIGNIFICANT CHANGE UP (ref 80–100)
MONOCYTES # BLD AUTO: 0.56 K/UL — SIGNIFICANT CHANGE UP (ref 0–0.9)
MONOCYTES NFR BLD AUTO: 4.7 % — SIGNIFICANT CHANGE UP (ref 2–14)
NEUTROPHILS # BLD AUTO: 9.45 K/UL — HIGH (ref 1.8–7.4)
NEUTROPHILS NFR BLD AUTO: 79.4 % — HIGH (ref 43–77)
NITRITE UR-MCNC: NEGATIVE — SIGNIFICANT CHANGE UP
NRBC BLD AUTO-RTO: 0 /100 WBCS — SIGNIFICANT CHANGE UP (ref 0–0)
PH UR: 8 — SIGNIFICANT CHANGE UP (ref 5–8)
PLATELET # BLD AUTO: 208 K/UL — SIGNIFICANT CHANGE UP (ref 150–400)
POTASSIUM SERPL-MCNC: 3.7 MMOL/L — SIGNIFICANT CHANGE UP (ref 3.5–5.3)
POTASSIUM SERPL-SCNC: 3.7 MMOL/L — SIGNIFICANT CHANGE UP (ref 3.5–5.3)
PROT SERPL-MCNC: 8 G/DL — SIGNIFICANT CHANGE UP (ref 6–8.3)
PROT UR-MCNC: NEGATIVE MG/DL — SIGNIFICANT CHANGE UP
RBC # BLD: 4.21 M/UL — SIGNIFICANT CHANGE UP (ref 3.8–5.2)
RBC # FLD: 12.3 % — SIGNIFICANT CHANGE UP (ref 10.3–14.5)
RBC CASTS # UR COMP ASSIST: 1 /HPF — SIGNIFICANT CHANGE UP (ref 0–4)
SODIUM SERPL-SCNC: 137 MMOL/L — SIGNIFICANT CHANGE UP (ref 135–145)
SP GR SPEC: 1.01 — SIGNIFICANT CHANGE UP (ref 1–1.03)
TROPONIN I, HIGH SENSITIVITY RESULT: 4.2 NG/L — SIGNIFICANT CHANGE UP
UROBILINOGEN FLD QL: 0.2 MG/DL — SIGNIFICANT CHANGE UP (ref 0.2–1)
WBC # BLD: 11.9 K/UL — HIGH (ref 3.8–10.5)
WBC # FLD AUTO: 11.9 K/UL — HIGH (ref 3.8–10.5)
WBC UR QL: 1 /HPF — SIGNIFICANT CHANGE UP (ref 0–5)

## 2025-03-26 PROCEDURE — 36415 COLL VENOUS BLD VENIPUNCTURE: CPT

## 2025-03-26 PROCEDURE — 70450 CT HEAD/BRAIN W/O DYE: CPT | Mod: 26,XU

## 2025-03-26 PROCEDURE — 70496 CT ANGIOGRAPHY HEAD: CPT | Mod: 26

## 2025-03-26 PROCEDURE — 85025 COMPLETE CBC W/AUTO DIFF WBC: CPT

## 2025-03-26 PROCEDURE — 99285 EMERGENCY DEPT VISIT HI MDM: CPT | Mod: 25

## 2025-03-26 PROCEDURE — 85730 THROMBOPLASTIN TIME PARTIAL: CPT

## 2025-03-26 PROCEDURE — 85379 FIBRIN DEGRADATION QUANT: CPT

## 2025-03-26 PROCEDURE — 80053 COMPREHEN METABOLIC PANEL: CPT

## 2025-03-26 PROCEDURE — 85610 PROTHROMBIN TIME: CPT

## 2025-03-26 PROCEDURE — 93010 ELECTROCARDIOGRAM REPORT: CPT

## 2025-03-26 PROCEDURE — 70496 CT ANGIOGRAPHY HEAD: CPT | Mod: MC

## 2025-03-26 PROCEDURE — 96374 THER/PROPH/DIAG INJ IV PUSH: CPT | Mod: XU

## 2025-03-26 PROCEDURE — 93005 ELECTROCARDIOGRAM TRACING: CPT

## 2025-03-26 PROCEDURE — 96375 TX/PRO/DX INJ NEW DRUG ADDON: CPT | Mod: XU

## 2025-03-26 PROCEDURE — 84484 ASSAY OF TROPONIN QUANT: CPT

## 2025-03-26 PROCEDURE — 96361 HYDRATE IV INFUSION ADD-ON: CPT

## 2025-03-26 PROCEDURE — 71045 X-RAY EXAM CHEST 1 VIEW: CPT

## 2025-03-26 PROCEDURE — 70498 CT ANGIOGRAPHY NECK: CPT | Mod: MC

## 2025-03-26 PROCEDURE — 81001 URINALYSIS AUTO W/SCOPE: CPT

## 2025-03-26 PROCEDURE — 70450 CT HEAD/BRAIN W/O DYE: CPT | Mod: MC

## 2025-03-26 PROCEDURE — 70498 CT ANGIOGRAPHY NECK: CPT | Mod: 26

## 2025-03-26 RX ADMIN — Medication 20 MILLIGRAM(S): at 00:47

## 2025-03-26 RX ADMIN — Medication 1000 MILLILITER(S): at 01:45

## 2025-03-26 RX ADMIN — Medication 1000 MILLIGRAM(S): at 01:01

## 2025-03-26 RX ADMIN — Medication 1000 MILLIGRAM(S): at 01:46

## 2025-03-26 RX ADMIN — Medication 1000 MILLILITER(S): at 00:45

## 2025-03-26 RX ADMIN — Medication 400 MILLIGRAM(S): at 00:46

## 2025-03-26 RX ADMIN — Medication 4 MILLIGRAM(S): at 00:46

## 2025-04-01 ENCOUNTER — APPOINTMENT (OUTPATIENT)
Dept: CARDIOLOGY | Facility: CLINIC | Age: 70
End: 2025-04-01
Payer: MEDICARE

## 2025-04-01 ENCOUNTER — NON-APPOINTMENT (OUTPATIENT)
Age: 70
End: 2025-04-01

## 2025-04-01 VITALS
OXYGEN SATURATION: 96 % | RESPIRATION RATE: 18 BRPM | HEART RATE: 79 BPM | SYSTOLIC BLOOD PRESSURE: 101 MMHG | BODY MASS INDEX: 23.23 KG/M2 | WEIGHT: 115 LBS | DIASTOLIC BLOOD PRESSURE: 68 MMHG

## 2025-04-01 PROCEDURE — 99214 OFFICE O/P EST MOD 30 MIN: CPT | Mod: 25

## 2025-04-01 PROCEDURE — 93000 ELECTROCARDIOGRAM COMPLETE: CPT

## 2025-04-29 ENCOUNTER — APPOINTMENT (OUTPATIENT)
Dept: PULMONOLOGY | Facility: CLINIC | Age: 70
End: 2025-04-29
Payer: MEDICARE

## 2025-04-29 ENCOUNTER — NON-APPOINTMENT (OUTPATIENT)
Age: 70
End: 2025-04-29

## 2025-04-29 VITALS
WEIGHT: 112 LBS | DIASTOLIC BLOOD PRESSURE: 77 MMHG | SYSTOLIC BLOOD PRESSURE: 127 MMHG | HEIGHT: 59 IN | RESPIRATION RATE: 16 BRPM | TEMPERATURE: 97.2 F | HEART RATE: 67 BPM | OXYGEN SATURATION: 96 % | BODY MASS INDEX: 22.58 KG/M2

## 2025-04-29 DIAGNOSIS — R91.1 SOLITARY PULMONARY NODULE: ICD-10-CM

## 2025-04-29 DIAGNOSIS — R06.83 SNORING: ICD-10-CM

## 2025-04-29 DIAGNOSIS — R93.89 ABNORMAL FINDINGS ON DIAGNOSTIC IMAGING OF OTHER SPECIFIED BODY STRUCTURES: ICD-10-CM

## 2025-04-29 PROCEDURE — 99214 OFFICE O/P EST MOD 30 MIN: CPT | Mod: 25

## 2025-06-10 NOTE — PHYSICAL THERAPY INITIAL EVALUATION ADULT - THERAPY FREQUENCY, PT EVAL
Per EV Medicaid active.  Precert not needed.  Patient needs [facility] appointment.   d/c PT pt independent

## 2025-07-14 ENCOUNTER — APPOINTMENT (OUTPATIENT)
Dept: OTOLARYNGOLOGY | Facility: CLINIC | Age: 70
End: 2025-07-14
Payer: MEDICARE

## 2025-07-14 VITALS
WEIGHT: 112 LBS | HEIGHT: 59 IN | SYSTOLIC BLOOD PRESSURE: 126 MMHG | BODY MASS INDEX: 22.58 KG/M2 | OXYGEN SATURATION: 96 % | DIASTOLIC BLOOD PRESSURE: 77 MMHG | HEART RATE: 68 BPM

## 2025-07-14 PROCEDURE — 31575 DIAGNOSTIC LARYNGOSCOPY: CPT

## 2025-07-14 PROCEDURE — 99214 OFFICE O/P EST MOD 30 MIN: CPT | Mod: 25

## (undated) DEVICE — LABELS BLANK W PEN

## (undated) DEVICE — Device

## (undated) DEVICE — URETERAL CATH RED RUBBER 12FR (WHITE)

## (undated) DEVICE — ELCTR ENT BOVIE SUCTION 10FR 6"

## (undated) DEVICE — DRAPE 3/4 SHEET 52X76"

## (undated) DEVICE — TUBING SUCTION NONCONDUCTIVE 6MM X 12FT

## (undated) DEVICE — CANISTER DISPOSABLE THIN WALL 3000CC

## (undated) DEVICE — SYR LUER LOK 10CC

## (undated) DEVICE — ELCTR BOVIE TIP NEEDLE INSULATED 6.5" EDGE

## (undated) DEVICE — DRSG TELFA 3 X 8

## (undated) DEVICE — URETERAL CATH RED RUBBER 8FR

## (undated) DEVICE — SUT VICRYL 3-0 18" SH UNDYED (POP-OFF)

## (undated) DEVICE — ELCTR BOVIE TIP BLADE INSULATED 2.8" EDGE WITH SAFETY

## (undated) DEVICE — DRSG CURITY GAUZE SPONGE 4 X 4" 12-PLY

## (undated) DEVICE — ELCTR GROUNDING PAD ADULT COVIDIEN

## (undated) DEVICE — NDL HYPO REGULAR BEVEL 25G X 1.5" (BLUE)

## (undated) DEVICE — PACK T & A

## (undated) DEVICE — GOWN LG

## (undated) DEVICE — WARMING BLANKET UPPER ADULT

## (undated) DEVICE — SOL ANTI FOG

## (undated) DEVICE — CATH NG SALEM SUMP 14FR

## (undated) DEVICE — TONSIL ROLLS

## (undated) DEVICE — ELCTR BOVIE PENCIL SMOKE EVACUATION

## (undated) DEVICE — VENODYNE/SCD SLEEVE CALF MEDIUM

## (undated) DEVICE — GLV 8 PROTEXIS (WHITE)